# Patient Record
Sex: MALE | Race: BLACK OR AFRICAN AMERICAN | NOT HISPANIC OR LATINO | Employment: FULL TIME | URBAN - METROPOLITAN AREA
[De-identification: names, ages, dates, MRNs, and addresses within clinical notes are randomized per-mention and may not be internally consistent; named-entity substitution may affect disease eponyms.]

---

## 2017-01-03 ENCOUNTER — TELEPHONE (OUTPATIENT)
Dept: RHEUMATOLOGY | Facility: CLINIC | Age: 57
End: 2017-01-03

## 2017-01-03 NOTE — TELEPHONE ENCOUNTER
----- Message from Alberto Lucero sent at 12/30/2016  3:15 PM CST -----  Contact: same  Unsuccessful call placed to pod.  Patient called in and stated he was returning a call.    [phone] call back number for patient.

## 2017-01-16 ENCOUNTER — TELEPHONE (OUTPATIENT)
Dept: RHEUMATOLOGY | Facility: CLINIC | Age: 57
End: 2017-01-16

## 2017-01-16 NOTE — TELEPHONE ENCOUNTER
Patient informed that his braces will come from [facility] in [place]. Pt also informed that if they need any additional information then they will contact him. Pt verbalized understanding.

## 2017-01-16 NOTE — TELEPHONE ENCOUNTER
----- Message from Diego Viera sent at 1/13/2017  2:26 PM CST -----  Contact: pt   Pt requesting a callback, needs to know what pharmacy his braces were sent to  Call Back#[phone]  Thanks

## 2017-01-25 ENCOUNTER — CLINICAL SUPPORT (OUTPATIENT)
Dept: REHABILITATION | Facility: HOSPITAL | Age: 57
End: 2017-01-25
Attending: INTERNAL MEDICINE
Payer: COMMERCIAL

## 2017-01-25 DIAGNOSIS — M25.562 ACUTE PAIN OF BOTH KNEES: ICD-10-CM

## 2017-01-25 DIAGNOSIS — M25.561 ACUTE PAIN OF BOTH KNEES: ICD-10-CM

## 2017-01-25 DIAGNOSIS — M25.669 DECREASED RANGE OF MOTION (ROM) OF KNEE: Primary | ICD-10-CM

## 2017-01-25 PROCEDURE — G8978 MOBILITY CURRENT STATUS: HCPCS | Mod: CK

## 2017-01-25 PROCEDURE — 97161 PT EVAL LOW COMPLEX 20 MIN: CPT

## 2017-01-25 PROCEDURE — G8979 MOBILITY GOAL STATUS: HCPCS | Mod: CJ

## 2017-01-25 NOTE — PROGRESS NOTES
"Physical Therapy Evaluation    Name: Dayo Goodman  Clinic Number: [id number]    Diagnosis:   Encounter Diagnoses   Name Primary?    Decreased range of motion (ROM) of knee Yes    Acute pain of both knees      Physician: Crissy Morrison MD  Treatment Orders: PT Eval and Treat    Past Medical History   Diagnosis Date    Arthritis     Cyst, Baker's knee      L    GERD (gastroesophageal reflux disease)     Kidney stones      Current Outpatient Prescriptions   Medication Sig    baclofen (LIORESAL) 10 MG tablet Take 10 mg by mouth once daily.    fexofenadine (ALLEGRA) 180 MG tablet Take 1 tablet (180 mg total) by mouth once daily. (Patient taking differently: Take 180 mg by mouth daily as needed. )    naproxen (EC NAPROSYN) 500 MG EC tablet Take 1 tablet (500 mg total) by mouth 2 (two) times daily with meals.    omeprazole (PRILOSEC) 40 MG capsule Take 1 capsule (40 mg total) by mouth every morning. (Patient taking differently: Take 40 mg by mouth daily as needed. )     No current facility-administered medications for this visit.      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    Primary concern/ Chief complaints:  Dayo is a 56 y.o. male that presents to [facility] secondary to polyarthralgia. Injury/surgery occurred approximately: 6 mos prior to evaluation.  X-ray was taken and revealed No fracture, subluxation, or other significant abnormality is identified.  Joints and soft tissues are unremarkable. Pt denied numbness and tingling of BLE. Pt reports approx 6 mos prior to evaluation Pt began experiencing bilateral knee pain. Pt reported gaining approx 30 lbs within this time frame. Pt attributed current work " duties, lack of physical exercise secondary to weight gain, and poor eating habbit to current condition. Pt reports swelling with knee buckling on occasion.     Onset/MYRNA: gradual    Previous treatment: Pt denied previous treatment for current condition.    Pain scale: Dayo rates pain on a scale of 0-10 to be n/a currently; 8 at best; 0 at worst.    Aggravating factors: squatting sit to stand, bending, and stooping   Relieving factors: Pt reports rest and ice     ADLs: Pt has a decrease ability to perform ADLs such as cooking and cleaning.     Patient Goals: Pt would like to decrease pain and increase function so he can return to pain free normal daily activities of: .    Objective       Gait: Pt ambulates  with decreased bilateral knee flexion, increase trunk lean for BLE advancement      Active/Passive ROM: (measured in degrees)   Knee (R) (L)   Flexion 110 120   Extension 0 0   Crepitus with left knee flexion       Sensation: Intact light touch grossly intact     Lower Extremity Strength (graded 0-5 out of 5)    Right LE Left LE   Hip flexion: 4/5 4/5   Hip ER: 4/5 4/5   Hip IR: 4/5 4/5   Knee extension:  4/5 4/5   Ankle dorsiflexion: 4/5 4/5   Posterior fibers of Gluteus Medius 4-/5 4-/5   Hip extension: 4-/5 4-/5   Knee flexion:  4/5 4/5   Ankle plantarflexion: 4/5 4/5     Special Tests: (pos. or neg.)    (R) (L)   DLS squat - -   Pt performed squat in parallel bars with increased use of BUE in a quad dominant position, and increased forward trunk lean.     Joint Mobility: (grading 0-6 out of 6)     RLE LLE   Superior glide of patella on femur 2 3   Inferior patella glide 2 3   Lateral patella glide 2 3   Medial patella glide 2 3       Flexibility:    Ely's test: R = +   ; L = +   90/90 HS length: R =  +; L = +     Edema:     Girth Measurement Joint line   Left  48.5 cm   Right 47.5  cm     Functional Status Measures:    Intake Score     Pts Physical FS Primary Measure      54                          Risk  Adjustment Statistical FOTO     53  Skill        Mobility   Current Status      CK at least 40% < 60% impaired, limited or restricted  Goal Status          CJ at least 20% < 40% impaired, limited or restricted      Patient History Examination Clinical Presentation Clinical Decision Making   Comorbidities:  None           Activity and Participation Restriction:  mobility    Body Systems:  ROM   Strength     Body Regions:  LES Stable and uncomplicated     Low            PT reviewed FOTO scores for Dayo Goodman on 01/25/2017.   FOTO scores were entered into [employer] - see media section.      PT Evaluation Completed? Yes  Discussed Plan of Care with patient: Yes    TREATMENT:    Pt. Education: Instructed pt. regarding: Proper technique with all exercises, diagnosis, prognosis, goals, and POC. Pt demo good understanding of the education provided. Dayo demonstrated good return demonstration of activities. No cultural, environmental, or spiritual barriers identified to treatment or learning.    Medical necessity is demonstrated by the following IMPAIRMENTS/PROBLEMS LIST:   1) Pain limiting function   2) Gait abnormality   3) Quadricep muscle weakness   4) Decreased flexibility   5) Decreased ROM   6) Decreased exercise ability   7) Lack of HEP    GOALS:   Short Term Goals: 6 weeks  1. Pt. to report decreased bilateral knee pain </= 3/10 at worst to increase tolerance for work related activities with or without AD  2. Pt. to demonstrate proper gait mechanics requiring mod. To min. verbal cues from PT  3. Pt. to demonstrate increased MMT for gluteus medius to 4+/5 to increase stability during community ambulation  4. Pt. to demonstrate increased MMT for quadriceps to 4+/5 to increase ability to squat  5. Pt to tolerate HEP to improve ROM and independence with ADL's  6. Pt will perform Double leg squats X 5 repetitions in a glut dominant manner       Long Term Goals: 12 weeks  1. Pt. to report decreased bilateral knee pain </=  1/10 at worst to increase tolerance for work related activities with or without AD  2. Pt. to demonstrate proper gait mechanics requiring min. verbal cues from PT  3. Pt. to demonstrate increased MMT for gluteus medius to 5/5 to increase stability during community ambulation  4. Pt. to demonstrate increased MMT for quadriceps to 5/5 to increase ability to squat  5. Pt to tolerate HEP to improve ROM and independence with ADL's  6. 6. Pt will perform Double leg squats X 10 repetitions in a glut dominant manner without UE use    8. Pt will report at CJ level (20-40% impaired) on LE FOTO survey to demonstrate increase in LE function and mobility in home and community environment.   9. Pt to be Independent with HEP to improve ROM and independence with ADL's     Assessment   This is a 56 y.o. male referred to outpatient physical therapy who presents with a medical diagnosis of polyarthralgia and PT diagnosis of decreased strength and ROM of BLE demonstrating joint dysfunction and functional limitation as described above. Pt presents with decreased strength and ROM resulting in a decreased tolerance for land based therex and work related activity tolerance and would benefit from aquatic therapy to performed therex in weight reduced environment.  Level of complexity is low;  based on patient's past medical history including the above co-morbidities and personal factors; functional limitations, and clinical presentation directly impacting his/her plan of care.    The goals were discussed and patient agreed with plan of care. Pt was given a HEP consisting of squats performed via sit to stand. Pt verbally understood the instructions as they were given and demonstrated proper form/ technique. Pt was advised to perform these exercises free of pain, and discontinue use if pain persists/worsens.t. Pt. will benefit from physcial therapy services in order to maximize pain free and/or functional use of bilateral knees.     Plan      Pt will be treated by physical therapy 1-2 times a week for 12 weeks for Pt education, HEP, therapeutic exercises, neuromuscular re-education, soft tissue and joint mobilizations; modalities prn to achieve established goals. Dayo may at times be seen by a PTA as part of the Rehab Team.     I certify the need for these services furnished under this plan of treatment and while under my care.______________________________ Physician/Referring Practitioner  Date of Signature    Phil Farley, PT

## 2017-01-25 NOTE — PLAN OF CARE
"Physical Therapy Evaluation    Name: Dayo Goodman  Clinic Number: [id number]    Diagnosis:   Encounter Diagnoses   Name Primary?    Decreased range of motion (ROM) of knee Yes    Acute pain of both knees      Physician: Crissy Morrison MD  Treatment Orders: PT Eval and Treat    Past Medical History   Diagnosis Date    Arthritis     Cyst, Baker's knee      L    GERD (gastroesophageal reflux disease)     Kidney stones      Current Outpatient Prescriptions   Medication Sig    baclofen (LIORESAL) 10 MG tablet Take 10 mg by mouth once daily.    fexofenadine (ALLEGRA) 180 MG tablet Take 1 tablet (180 mg total) by mouth once daily. (Patient taking differently: Take 180 mg by mouth daily as needed. )    naproxen (EC NAPROSYN) 500 MG EC tablet Take 1 tablet (500 mg total) by mouth 2 (two) times daily with meals.    omeprazole (PRILOSEC) 40 MG capsule Take 1 capsule (40 mg total) by mouth every morning. (Patient taking differently: Take 40 mg by mouth daily as needed. )     No current facility-administered medications for this visit.      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    Primary concern/ Chief complaints:  Dayo is a 56 y.o. male that presents to [facility] secondary to polyarthralgia. Injury/surgery occurred approximately: 6 mos prior to evaluation.  X-ray was taken and revealed No fracture, subluxation, or other significant abnormality is identified.  Joints and soft tissues are unremarkable. Pt denied numbness and tingling of BLE. Pt reports approx 6 mos prior to evaluation Pt began experiencing bilateral knee pain. Pt reported gaining approx 30 lbs within this time frame. Pt attributed current work " duties, lack of physical exercise secondary to weight gain, and poor eating habbit to current condition. Pt reports swelling with knee buckling on occasion.     Onset/YMRNA: gradual    Previous treatment: Pt denied previous treatment for current condition.    Pain scale: Dayo rates pain on a scale of 0-10 to be n/a currently; 8 at best; 0 at worst.    Aggravating factors: squatting sit to stand, bending, and stooping   Relieving factors: Pt reports rest and ice     ADLs: Pt has a decrease ability to perform ADLs such as cooking and cleaning.     Patient Goals: Pt would like to decrease pain and increase function so he can return to pain free normal daily activities of: .    Objective       Gait: Pt ambulates  with decreased bilateral knee flexion, increase trunk lean for BLE advancement      Active/Passive ROM: (measured in degrees)   Knee (R) (L)   Flexion 110 120   Extension 0 0   Crepitus with left knee flexion       Sensation: Intact light touch grossly intact     Lower Extremity Strength (graded 0-5 out of 5)    Right LE Left LE   Hip flexion: 4/5 4/5   Hip ER: 4/5 4/5   Hip IR: 4/5 4/5   Knee extension:  4/5 4/5   Ankle dorsiflexion: 4/5 4/5   Posterior fibers of Gluteus Medius 4-/5 4-/5   Hip extension: 4-/5 4-/5   Knee flexion:  4/5 4/5   Ankle plantarflexion: 4/5 4/5     Special Tests: (pos. or neg.)    (R) (L)   DLS squat - -   Pt performed squat in parallel bars with increased use of BUE in a quad dominant position, and increased forward trunk lean.     Joint Mobility: (grading 0-6 out of 6)     RLE LLE   Superior glide of patella on femur 2 3   Inferior patella glide 2 3   Lateral patella glide 2 3   Medial patella glide 2 3       Flexibility:    Ely's test: R = +   ; L = +   90/90 HS length: R =  +; L = +     Edema:     Girth Measurement Joint line   Left  48.5 cm   Right 47.5  cm     Functional Status Measures:    Intake Score     Pts Physical FS Primary Measure      54                          Risk  Adjustment Statistical FOTO     53  Skill        Mobility   Current Status      CK at least 40% < 60% impaired, limited or restricted  Goal Status          CJ at least 20% < 40% impaired, limited or restricted      Patient History Examination Clinical Presentation Clinical Decision Making   Comorbidities:  None           Activity and Participation Restriction:  mobility    Body Systems:  ROM   Strength     Body Regions:  LES Stable and uncomplicated     Low            PT reviewed FOTO scores for Dayo Goodman on 01/25/2017.   FOTO scores were entered into [employer] - see media section.      PT Evaluation Completed? Yes  Discussed Plan of Care with patient: Yes    TREATMENT:    Pt. Education: Instructed pt. regarding: Proper technique with all exercises, diagnosis, prognosis, goals, and POC. Pt demo good understanding of the education provided. Dayo demonstrated good return demonstration of activities. No cultural, environmental, or spiritual barriers identified to treatment or learning.    Medical necessity is demonstrated by the following IMPAIRMENTS/PROBLEMS LIST:   1) Pain limiting function   2) Gait abnormality   3) Quadricep muscle weakness   4) Decreased flexibility   5) Decreased ROM   6) Decreased exercise ability   7) Lack of HEP    GOALS:   Short Term Goals: 6 weeks  1. Pt. to report decreased bilateral knee pain </= 3/10 at worst to increase tolerance for work related activities with or without AD  2. Pt. to demonstrate proper gait mechanics requiring mod. To min. verbal cues from PT  3. Pt. to demonstrate increased MMT for gluteus medius to 4+/5 to increase stability during community ambulation  4. Pt. to demonstrate increased MMT for quadriceps to 4+/5 to increase ability to squat  5. Pt to tolerate HEP to improve ROM and independence with ADL's  6. Pt will perform Double leg squats X 5 repetitions in a glut dominant manner       Long Term Goals: 12 weeks  1. Pt. to report decreased bilateral knee pain </=  1/10 at worst to increase tolerance for work related activities with or without AD  2. Pt. to demonstrate proper gait mechanics requiring min. verbal cues from PT  3. Pt. to demonstrate increased MMT for gluteus medius to 5/5 to increase stability during community ambulation  4. Pt. to demonstrate increased MMT for quadriceps to 5/5 to increase ability to squat  5. Pt to tolerate HEP to improve ROM and independence with ADL's  6. 6. Pt will perform Double leg squats X 10 repetitions in a glut dominant manner without UE use    8. Pt will report at CJ level (20-40% impaired) on LE FOTO survey to demonstrate increase in LE function and mobility in home and community environment.   9. Pt to be Independent with HEP to improve ROM and independence with ADL's     Assessment   This is a 56 y.o. male referred to outpatient physical therapy who presents with a medical diagnosis of polyarthralgia and PT diagnosis of decreased strength and ROM of BLE demonstrating joint dysfunction and functional limitation as described above. Pt presents with decreased strength and ROM resulting in a decreased tolerance for land based therex and work related activity tolerance and would benefit from aquatic therapy to performed therex in weight reduced environment.  Level of complexity is low;  based on patient's past medical history including the above co-morbidities and personal factors; functional limitations, and clinical presentation directly impacting his/her plan of care.    The goals were discussed and patient agreed with plan of care. Pt was given a HEP consisting of squats performed via sit to stand. Pt verbally understood the instructions as they were given and demonstrated proper form/ technique. Pt was advised to perform these exercises free of pain, and discontinue use if pain persists/worsens.t. Pt. will benefit from physcial therapy services in order to maximize pain free and/or functional use of bilateral knees.     Plan      Pt will be treated by physical therapy 1-2 times a week for 12 weeks for Pt education, HEP, therapeutic exercises, neuromuscular re-education, soft tissue and joint mobilizations; modalities prn to achieve established goals. Dayo may at times be seen by a PTA as part of the Rehab Team.     I certify the need for these services furnished under this plan of treatment and while under my care.______________________________ Physician/Referring Practitioner  Date of Signature    Phil Farley, PT

## 2017-03-23 ENCOUNTER — DOCUMENTATION ONLY (OUTPATIENT)
Dept: FAMILY MEDICINE | Facility: CLINIC | Age: 57
End: 2017-03-23

## 2017-03-29 ENCOUNTER — OFFICE VISIT (OUTPATIENT)
Dept: RHEUMATOLOGY | Facility: CLINIC | Age: 57
End: 2017-03-29
Payer: COMMERCIAL

## 2017-03-29 VITALS
HEIGHT: 76 IN | WEIGHT: 315 LBS | SYSTOLIC BLOOD PRESSURE: 131 MMHG | BODY MASS INDEX: 38.36 KG/M2 | DIASTOLIC BLOOD PRESSURE: 81 MMHG | HEART RATE: 59 BPM

## 2017-03-29 DIAGNOSIS — H93.12 TINNITUS, LEFT: Primary | ICD-10-CM

## 2017-03-29 DIAGNOSIS — Z79.1 NSAID LONG-TERM USE: ICD-10-CM

## 2017-03-29 DIAGNOSIS — M15.9 PRIMARY OSTEOARTHRITIS INVOLVING MULTIPLE JOINTS: ICD-10-CM

## 2017-03-29 PROCEDURE — 99213 OFFICE O/P EST LOW 20 MIN: CPT | Mod: S$GLB,,, | Performed by: INTERNAL MEDICINE

## 2017-03-29 PROCEDURE — 99999 PR PBB SHADOW E&M-EST. PATIENT-LVL III: CPT | Mod: PBBFAC,,, | Performed by: INTERNAL MEDICINE

## 2017-03-29 PROCEDURE — 1160F RVW MEDS BY RX/DR IN RCRD: CPT | Mod: S$GLB,,, | Performed by: INTERNAL MEDICINE

## 2017-03-29 RX ORDER — NAPROXEN 500 MG/1
500 TABLET ORAL 2 TIMES DAILY WITH MEALS
Qty: 60 TABLET | Refills: 5 | Status: SHIPPED | OUTPATIENT
Start: 2017-03-29 | End: 2017-07-23

## 2017-03-29 NOTE — PROGRESS NOTES
"Subjective:       Patient ID: Dayo Goodman is a 56 y.o. male.    Chief Complaint: Osteoarthritis   HPI56 yo male with h/o kidney stones is here for follow-up for osteoarthritis.  Reports right knee pain and elbow pain is significantly better as the last visit.  Reports naproxen helps the joint pains.  Tolerating medication without any side effects. He chronically elevated but normal aldolase secondary to ethnicity-no evidence of weakness or myositis  Today, he is also complaining of tinnitus in left ear for the last 1 week.  Discontinuing meds naproxen did not improve tinnitus.  Denies any active discharge from ear, denies any fever.       Review of Systems   Constitutional: Negative for fatigue and fever.   HENT: Positive for tinnitus. Negative for ear discharge and ear pain.    Eyes: Negative for pain and redness.   Respiratory: Negative for cough and shortness of breath.    Cardiovascular: Negative for chest pain and palpitations.   Gastrointestinal: Negative for abdominal distention and abdominal pain.   Genitourinary: Negative for genital sores and hematuria.   Musculoskeletal: Negative for gait problem and myalgias.         Objective:     /81  Pulse (!) 59  Ht 6' 4" (1.93 m)  Wt (!) 150.6 kg (332 lb 0.2 oz)  BMI 40.41 kg/m2     Physical Exam   Constitutional: He is oriented to person, place, and time and well-developed, well-nourished, and in no distress.   HENT:   Head: Normocephalic and atraumatic.   Right Ear: External ear normal.   Left Ear: External ear normal.   Eyes: Conjunctivae and EOM are normal. Pupils are equal, round, and reactive to light.   Neck: Normal range of motion. Neck supple. No thyromegaly present.   Cardiovascular: Normal rate and regular rhythm.    Pulmonary/Chest: Effort normal and breath sounds normal.   Abdominal: Soft. Bowel sounds are normal.   Neurological: He is alert and oriented to person, place, and time.   Skin: Skin is warm and dry.     Psychiatric: Memory and " affect normal.   Musculoskeletal: He exhibits no edema.     Strength 5 x 5 and a reflexes 2+ in all extremities         .LASSTCBC  CMP  Sodium   Date Value Ref Range Status   10/21/2016 140 136 - 145 mmol/L Final     Potassium   Date Value Ref Range Status   10/21/2016 4.2 3.5 - 5.1 mmol/L Final     Chloride   Date Value Ref Range Status   10/21/2016 104 95 - 110 mmol/L Final     CO2   Date Value Ref Range Status   10/21/2016 29 23 - 29 mmol/L Final     Glucose   Date Value Ref Range Status   10/21/2016 82 70 - 110 mg/dL Final     BUN, Bld   Date Value Ref Range Status   10/21/2016 12 6 - 20 mg/dL Final     Creatinine   Date Value Ref Range Status   10/21/2016 1.2 0.5 - 1.4 mg/dL Final     Calcium   Date Value Ref Range Status   10/21/2016 8.9 8.7 - 10.5 mg/dL Final     Total Protein   Date Value Ref Range Status   07/21/2016 7.3 6.0 - 8.4 g/dL Final     Albumin   Date Value Ref Range Status   07/21/2016 3.5 3.5 - 5.2 g/dL Final     Total Bilirubin   Date Value Ref Range Status   07/21/2016 0.8 0.1 - 1.0 mg/dL Final     Comment:     For infants and newborns, interpretation of results should be based  on gestational age, weight and in agreement with clinical  observations.  Premature Infant recommended reference ranges:  Up to 24 hours.............<8.0 mg/dL  Up to 48 hours............<12.0 mg/dL  3-5 days..................<15.0 mg/dL  6-29 days.................<15.0 mg/dL       Alkaline Phosphatase   Date Value Ref Range Status   07/21/2016 62 55 - 135 U/L Final     AST   Date Value Ref Range Status   07/21/2016 19 10 - 40 U/L Final     ALT   Date Value Ref Range Status   07/21/2016 16 10 - 44 U/L Final     Anion Gap   Date Value Ref Range Status   10/21/2016 7 (L) 8 - 16 mmol/L Final     eGFR if    Date Value Ref Range Status   10/21/2016 >60.0 >60 mL/min/1.73 m^2 Final     eGFR if non    Date Value Ref Range Status   10/21/2016 >60.0 >60 mL/min/1.73 m^2 Final     Comment:      Calculation used to obtain the estimated glomerular filtration  rate (eGFR) is the CKD-EPI equation. Since race is unknown   in our information system, the eGFR values for   -American and Non--American patients are given   for each creatinine result.       Lab Results   Component Value Date    SEDRATE 13 (H) 10/21/2016     Lab Results   Component Value Date    RF <10.0 10/21/2016   Results for HERMELINDA TARIQ (MRN [id number]) as of 3/29/2017 11:24   Ref. Range 12/29/2016 12:04   Aldolase Latest Ref Range: 1.2 - 7.6 U/L 4.0       Results for HERMELINDA TARIQ (MRN [id number]) as of 12/29/2016 12:05   Ref. Range 3/25/2012 12:41 10/9/2013 07:52 10/9/2013 07:52 10/9/2013 16:47 10/21/2016 15:21   CPK Latest Ref Range: 20 - 200 U/L 565 (H) 512 (H) 512 (H) 419 (H) 381 (H)   Results for HERMELINDA TARIQ (MRN [id number]) as of 3/29/2017 11:24   Ref. Range 10/9/2013 07:52 10/9/2013 07:52 10/9/2013 16:47 10/21/2016 15:21 12/29/2016 12:04   CPK Latest Ref Range: 20 - 200 U/L 512 (H) 512 (H) 419 (H) 381 (H) 497 (H)           Assessment:   Left ear tinnitus-ENT referral  Generalized osteoarthritis  Long-term NSAID use  Elevated CPK-chronic elevation-likely secondary to ethnicity-no evidence of weakness on exam-check aldolase    Plan:  ENT referral for chronic tinnitus  Continue naproxen 500 mg twice a day as needed for arthralgias  Return to clinic in 1 year

## 2017-04-04 ENCOUNTER — OFFICE VISIT (OUTPATIENT)
Dept: FAMILY MEDICINE | Facility: CLINIC | Age: 57
End: 2017-04-04
Payer: COMMERCIAL

## 2017-04-04 ENCOUNTER — DOCUMENTATION ONLY (OUTPATIENT)
Dept: FAMILY MEDICINE | Facility: CLINIC | Age: 57
End: 2017-04-04

## 2017-04-04 VITALS
HEIGHT: 76 IN | SYSTOLIC BLOOD PRESSURE: 118 MMHG | OXYGEN SATURATION: 98 % | BODY MASS INDEX: 38.36 KG/M2 | WEIGHT: 315 LBS | TEMPERATURE: 98 F | DIASTOLIC BLOOD PRESSURE: 76 MMHG | HEART RATE: 59 BPM

## 2017-04-04 DIAGNOSIS — J06.9 UPPER RESPIRATORY TRACT INFECTION, UNSPECIFIED TYPE: Primary | ICD-10-CM

## 2017-04-04 PROCEDURE — 1160F RVW MEDS BY RX/DR IN RCRD: CPT | Mod: S$GLB,,, | Performed by: PHYSICIAN ASSISTANT

## 2017-04-04 PROCEDURE — 99213 OFFICE O/P EST LOW 20 MIN: CPT | Mod: S$GLB,,, | Performed by: PHYSICIAN ASSISTANT

## 2017-04-04 PROCEDURE — 99999 PR PBB SHADOW E&M-EST. PATIENT-LVL III: CPT | Mod: PBBFAC,,, | Performed by: PHYSICIAN ASSISTANT

## 2017-04-04 RX ORDER — AMOXICILLIN AND CLAVULANATE POTASSIUM 875; 125 MG/1; MG/1
1 TABLET, FILM COATED ORAL EVERY 12 HOURS
Qty: 20 TABLET | Refills: 0 | Status: SHIPPED | OUTPATIENT
Start: 2017-04-04 | End: 2017-04-14

## 2017-04-04 RX ORDER — METHYLPREDNISOLONE 4 MG/1
4 TABLET ORAL DAILY
Qty: 21 TABLET | Refills: 0 | Status: SHIPPED | OUTPATIENT
Start: 2017-04-04 | End: 2017-06-12

## 2017-04-04 NOTE — PROGRESS NOTES
Pre-Visit Chart Review  For Appointment Scheduled on 04/04/2017      Health Maintenance Due   Topic Date Due    TETANUS VACCINE  08/26/1977    Colonoscopy  08/26/2009    Influenza Vaccine  08/01/2016

## 2017-04-04 NOTE — PROGRESS NOTES
Subjective:       Patient ID: Dayo Goodman is a 56 y.o. male.    Chief Complaint: Cough (congestion)    Cough   This is a new problem. The current episode started in the past 7 days. The problem has been gradually worsening. The problem occurs constantly. The cough is non-productive. Associated symptoms include nasal congestion, postnasal drip, rhinorrhea and a sore throat. Pertinent negatives include no chest pain, chills, ear congestion, ear pain, eye redness, fever, headaches, shortness of breath or wheezing. The symptoms are aggravated by lying down. He has tried OTC cough suppressant for the symptoms. The treatment provided no relief.     Review of Systems   Constitutional: Negative for activity change, appetite change, chills, fatigue and fever.   HENT: Positive for congestion, postnasal drip, rhinorrhea, sinus pressure and sore throat. Negative for ear discharge, ear pain, facial swelling, hearing loss, mouth sores, nosebleeds, tinnitus and trouble swallowing.    Eyes: Negative for discharge, redness and visual disturbance.   Respiratory: Positive for cough. Negative for chest tightness, shortness of breath and wheezing.    Cardiovascular: Negative for chest pain, palpitations and leg swelling.   Gastrointestinal: Negative for abdominal pain, nausea and vomiting.   Musculoskeletal: Negative for neck stiffness.   Neurological: Negative for headaches.       Objective:      Physical Exam   Constitutional: He appears well-developed and well-nourished. No distress.   HENT:   Head: Normocephalic and atraumatic.   Right Ear: External ear normal.   Left Ear: External ear normal.   Mouth/Throat: Uvula is midline and mucous membranes are normal. No uvula swelling. No oropharyngeal exudate, posterior oropharyngeal edema, posterior oropharyngeal erythema or tonsillar abscesses.   Eyes: Conjunctivae and EOM are normal. Pupils are equal, round, and reactive to light. Right eye exhibits no discharge. Left eye exhibits no  discharge.   Neck: Normal range of motion. Neck supple. No thyromegaly present.   Cardiovascular: Normal rate, regular rhythm and normal heart sounds.  Exam reveals no gallop and no friction rub.    No murmur heard.  Pulmonary/Chest: Effort normal. No respiratory distress. He has no wheezes. He has rhonchi in the left upper field, the left middle field and the left lower field. He has no rales.   Abdominal: Soft. Bowel sounds are normal. There is no tenderness.   Lymphadenopathy:     He has no cervical adenopathy.   Skin: He is not diaphoretic.       Assessment:       1. Upper respiratory tract infection, unspecified type        Plan:       Dayo was seen today for cough.    Diagnoses and all orders for this visit:    Upper respiratory tract infection, unspecified type  -     methylPREDNISolone (MEDROL, EMILEE,) 4 mg tablet; Take 1 tablet (4 mg total) by mouth once daily. follow package directions  -     amoxicillin-clavulanate 875-125mg (AUGMENTIN) 875-125 mg per tablet; Take 1 tablet by mouth every 12 (twelve) hours.

## 2017-04-11 ENCOUNTER — TELEPHONE (OUTPATIENT)
Dept: FAMILY MEDICINE | Facility: CLINIC | Age: 57
End: 2017-04-11

## 2017-04-11 DIAGNOSIS — R05.9 COUGH: Primary | ICD-10-CM

## 2017-04-11 RX ORDER — BENZONATATE 100 MG/1
100 CAPSULE ORAL 3 TIMES DAILY PRN
Qty: 30 CAPSULE | Refills: 0 | Status: SHIPPED | OUTPATIENT
Start: 2017-04-11 | End: 2017-04-21

## 2017-04-11 NOTE — TELEPHONE ENCOUNTER
Patient was seen on 4/4/17 and was given a medrol shabana and augmentin  He has two days of antibiotic left.  He states that the cough is not any better.  Wants to know he can take for the cough.

## 2017-04-11 NOTE — TELEPHONE ENCOUNTER
----- Message from Eli Wan sent at 4/11/2017 12:09 PM CDT -----  Contact:   call //[phone]    Calling to  Speak to the    Nurse , pt   Needs a  Script  For  A  Bad  ,  Cold  ,  Not  Going away  // please call   [employer] [id number] - [address] AT [facility]  [address]  [place] [address]  Phone: [phone] Fax: [phone]

## 2017-05-09 ENCOUNTER — TELEPHONE (OUTPATIENT)
Dept: CARDIOLOGY | Facility: CLINIC | Age: 57
End: 2017-05-09

## 2017-05-09 NOTE — TELEPHONE ENCOUNTER
----- Message from Maryellen Brown sent at 5/9/2017  2:40 PM CDT -----  Contact: PAtient  Patient states his legs are hurting and feel tired. Not swollen, just achy. He is on his feet a lot for work. Please call patient at [phone] to advise. Medication he takes now is Napercin.

## 2017-05-29 ENCOUNTER — DOCUMENTATION ONLY (OUTPATIENT)
Dept: REHABILITATION | Facility: HOSPITAL | Age: 57
End: 2017-05-29

## 2017-05-29 NOTE — PROGRESS NOTES
PHYSICAL THERAPY DISCHARGE SUMMARY     Name: Dayo Goodman  [facility] Number: [id number]    Diagnosis: Decreased range of motion, acute pain of both knees  Physician: Crissy Morrison MD  Treatment Orders: Therapeutic exercise  Past Medical History:   Diagnosis Date    Arthritis     Cyst, Baker's knee     L    GERD (gastroesophageal reflux disease)     Kidney stones        Initial visit: 1/25/17  Date of Last visit: 1/25/17  Date of Discharge Note:  5/29/17  Total Visits Received: 1  Missed Visits: 7  ASSESSMENT   Status Towards Goals Met:  Patient did not return to physical therapy.  Pt is discharged prior to achieving the goals established in the initial evaluation due to discontinuing physical therapy.     Goals Not achieved and why: see above    Discharge reason : Pt has not re-scheduled further follow-up sessions    G-CODE: Discharge g-code not filed due to discharge note being documentation only. Upon eval Pt was at CK at least 40% < 60% impaired, limited or restricted on the FOTO with g-code goal set at CJ at least 20% < 40% impaired, limited or restricted    PLAN   This patient is discharged from Physical Therapy Services.     Medical necessity is demonstrated by the following IMPAIRMENTS/PROBLEMS LIST:                        1) Pain limiting function                        2) Gait abnormality                        3) Quadricep muscle weakness                        4) Decreased flexibility                        5) Decreased ROM                        6) Decreased exercise ability                        7) Lack of HEP     GOALS:   Short Term Goals: 6 weeks  1. Pt. to report decreased bilateral knee pain </= 3/10 at worst to increase tolerance for work related activities with or without AD  2. Pt. to demonstrate proper gait mechanics requiring mod. To min. verbal cues from PT  3. Pt. to demonstrate increased MMT for gluteus medius to 4+/5 to increase stability during community ambulation  4. Pt. to  demonstrate increased MMT for quadriceps to 4+/5 to increase ability to squat  5. Pt to tolerate HEP to improve ROM and independence with ADL's  6. Pt will perform Double leg squats X 5 repetitions in a glut dominant manner       Long Term Goals: 12 weeks  1. Pt. to report decreased bilateral knee pain </= 1/10 at worst to increase tolerance for work related activities with or without AD  2. Pt. to demonstrate proper gait mechanics requiring min. verbal cues from PT  3. Pt. to demonstrate increased MMT for gluteus medius to 5/5 to increase stability during community ambulation  4. Pt. to demonstrate increased MMT for quadriceps to 5/5 to increase ability to squat  5. Pt to tolerate HEP to improve ROM and independence with ADL's  6. 6. Pt will perform Double leg squats X 10 repetitions in a glut dominant manner without UE use    8. Pt will report at CJ level (20-40% impaired) on LE FOTO survey to demonstrate increase in LE function and mobility in home and community environment.   9. Pt to be Independent with HEP to improve ROM and independence with ADL's

## 2017-06-12 ENCOUNTER — OFFICE VISIT (OUTPATIENT)
Dept: FAMILY MEDICINE | Facility: CLINIC | Age: 57
End: 2017-06-12
Payer: COMMERCIAL

## 2017-06-12 VITALS
DIASTOLIC BLOOD PRESSURE: 73 MMHG | BODY MASS INDEX: 38.36 KG/M2 | WEIGHT: 315 LBS | HEART RATE: 53 BPM | HEIGHT: 76 IN | SYSTOLIC BLOOD PRESSURE: 126 MMHG

## 2017-06-12 DIAGNOSIS — M79.645 FINGER PAIN, LEFT: Primary | ICD-10-CM

## 2017-06-12 PROCEDURE — 99214 OFFICE O/P EST MOD 30 MIN: CPT | Mod: 25,S$GLB,, | Performed by: FAMILY MEDICINE

## 2017-06-12 PROCEDURE — 90471 IMMUNIZATION ADMIN: CPT | Mod: S$GLB,,, | Performed by: FAMILY MEDICINE

## 2017-06-12 PROCEDURE — 90715 TDAP VACCINE 7 YRS/> IM: CPT | Mod: S$GLB,,, | Performed by: FAMILY MEDICINE

## 2017-06-12 PROCEDURE — 99999 PR PBB SHADOW E&M-EST. PATIENT-LVL III: CPT | Mod: PBBFAC,,, | Performed by: FAMILY MEDICINE

## 2017-06-12 RX ORDER — IBUPROFEN 800 MG/1
800 TABLET ORAL 3 TIMES DAILY
Qty: 60 TABLET | Refills: 0 | Status: SHIPPED | OUTPATIENT
Start: 2017-06-12 | End: 2017-10-26

## 2017-06-12 NOTE — PROGRESS NOTES
Subjective:       Patient ID: Dayo Goodman is a 56 y.o. male.    Chief Complaint: Hand Pain ( (left) finger smashed in airplane door)    HPI     Left ring finger pain  Pt reports that he injured his finger on 2 days ago.   Pt states that he works as a  for [employer].   He was closing a compartment door and he had his finger jammed.   He has had bleeding to the digit.   He has not received a tetanus shot in last 10 years.   Pt has pain to the finger.   He has noticed discoloration to the digit (puple)    Review of Systems   Constitutional: Negative for chills and fever.   Respiratory: Negative for chest tightness and shortness of breath.    Cardiovascular: Negative for chest pain and leg swelling.   Gastrointestinal: Negative for abdominal pain, constipation, diarrhea and vomiting.   Genitourinary: Negative for decreased urine volume, dysuria and hematuria.   Musculoskeletal: Negative for arthralgias.        Finger pain   Neurological: Negative for weakness and light-headedness.       Objective:      Physical Exam   Constitutional: He appears well-developed and well-nourished. No distress.   Obese male in NAD.   HENT:   Head: Normocephalic and atraumatic.   Mouth/Throat: Oropharynx is clear and moist. No oropharyngeal exudate.   Eyes: EOM are normal. Pupils are equal, round, and reactive to light.   Neck: Normal range of motion. Neck supple. No thyromegaly present.   Cardiovascular: Normal rate, regular rhythm, normal heart sounds and intact distal pulses.    Pulmonary/Chest: Effort normal and breath sounds normal. No respiratory distress. He has no wheezes.   Abdominal: Soft. Bowel sounds are normal. He exhibits no distension and no mass. There is no tenderness.   Musculoskeletal: He exhibits no edema.   Mild erythema to distal 4th finger with superficial laceration. No evidence of active infectious process. Hanging section of skin to lateral aspect of distal phalanx.   Lymphadenopathy:     He has no  cervical adenopathy.   Neurological: He is alert.   Skin: Skin is warm. No rash noted. No erythema.   Psychiatric: He has a normal mood and affect. His behavior is normal.   Vitals reviewed.      Assessment:       1. Finger pain, left        Plan:       1. Finger pain, left  Area cleaned with peroxide and debrided. Silver nitrate applied to wound and bacitracin applied with bandage.    Advised neosporin and wound care to keep dry and clean.  - ibuprofen (ADVIL,MOTRIN) 800 MG tablet; Take 1 tablet (800 mg total) by mouth 3 (three) times daily.  Dispense: 60 tablet; Refill: 0    Portions of this note were created using Dragon voice recognition software. There may be voice recognition errors found in the text, and attempts were made to correct these errors prior to signature    Juan Kumar MD    Family Medicine  6/12/2017

## 2017-06-12 NOTE — LETTER
June 12, 2017      [facility]  [address] [place] [address]  Phone: [phone]  Fax: [phone]       Patient: aDyo Goodman   YOB: 1960  Date of Visit: 06/12/2017    To Whom It May Concern:    Dayo Sabillon was at [facility] on 06/12/2017. He may return to work/school on 6/28/17 with no restrictions. If you have any questions or concerns, or if I can be of further assistance, please do not hesitate to contact me.    Sincerely,  Portions of this note were created using Dragon voice recognition software. There may be voice recognition errors found in the text, and attempts were made to correct these errors prior to signature    Juan Kumar MD    Family Medicine  6/12/2017    Juan Kumar MD

## 2017-06-27 ENCOUNTER — TELEPHONE (OUTPATIENT)
Dept: FAMILY MEDICINE | Facility: CLINIC | Age: 57
End: 2017-06-27

## 2017-06-27 ENCOUNTER — TELEPHONE (OUTPATIENT)
Dept: ORTHOPEDICS | Facility: CLINIC | Age: 57
End: 2017-06-27

## 2017-06-27 ENCOUNTER — OFFICE VISIT (OUTPATIENT)
Dept: CARDIOLOGY | Facility: CLINIC | Age: 57
End: 2017-06-27
Payer: COMMERCIAL

## 2017-06-27 VITALS
HEIGHT: 76 IN | WEIGHT: 315 LBS | SYSTOLIC BLOOD PRESSURE: 129 MMHG | BODY MASS INDEX: 38.36 KG/M2 | HEART RATE: 53 BPM | DIASTOLIC BLOOD PRESSURE: 77 MMHG

## 2017-06-27 DIAGNOSIS — M79.89 LEG SWELLING: ICD-10-CM

## 2017-06-27 DIAGNOSIS — R60.9 EDEMA, UNSPECIFIED TYPE: ICD-10-CM

## 2017-06-27 DIAGNOSIS — R06.09 DYSPNEA ON EXERTION: Primary | ICD-10-CM

## 2017-06-27 PROCEDURE — 99999 PR PBB SHADOW E&M-EST. PATIENT-LVL III: CPT | Mod: PBBFAC,,, | Performed by: INTERNAL MEDICINE

## 2017-06-27 PROCEDURE — 99214 OFFICE O/P EST MOD 30 MIN: CPT | Mod: S$GLB,,, | Performed by: INTERNAL MEDICINE

## 2017-06-27 NOTE — TELEPHONE ENCOUNTER
----- Message from Anjel Garcia sent at 6/27/2017  2:12 PM CDT -----  Contact: pt  José: He's calling in regards to returning to work excuse for 7/1/17, pt states his finger is , please advise, [phone] (home)     Sam: He's calling in regards to a knee brace being ordered for his LT knee, please advise, [phone] (home)

## 2017-06-27 NOTE — PROGRESS NOTES
Subjective:   Patient ID:  Dayo Goodman is a 56 y.o. male who presents for follow-up of Venous insufficeincy and  Hx of venous stents. Patient denies CP, angina or anginal equivalent.    Edema   This is a chronic problem. The current episode started more than 1 year ago. The problem occurs intermittently. The problem has been gradually improving. Pertinent negatives include no chest pain. The symptoms are aggravated by walking. He has tried immobilization for the symptoms.       Review of Systems   Constitution: Negative. Negative for weight gain.   HENT: Negative.    Eyes: Negative.    Cardiovascular: Negative.  Negative for chest pain, leg swelling and palpitations.   Respiratory: Negative.  Negative for shortness of breath.    Endocrine: Negative.    Hematologic/Lymphatic: Negative.    Skin: Negative.    Musculoskeletal: Negative for muscle weakness.   Gastrointestinal: Negative.    Genitourinary: Negative.    Neurological: Negative.  Negative for dizziness.   Psychiatric/Behavioral: Negative.    Allergic/Immunologic: Negative.      Family History   Problem Relation Age of Onset    Heart disease Mother     Cancer Father     Stroke Sister      Past Medical History:   Diagnosis Date    Arthritis     Cyst, Baker's knee     L    GERD (gastroesophageal reflux disease)     Kidney stones      Current Outpatient Prescriptions on File Prior to Visit   Medication Sig Dispense Refill    ibuprofen (ADVIL,MOTRIN) 800 MG tablet Take 1 tablet (800 mg total) by mouth 3 (three) times daily. 60 tablet 0    omeprazole (PRILOSEC) 40 MG capsule Take 1 capsule (40 mg total) by mouth every morning. (Patient taking differently: Take 40 mg by mouth daily as needed. ) 60 capsule 5    fexofenadine (ALLEGRA) 180 MG tablet Take 1 tablet (180 mg total) by mouth once daily. (Patient taking differently: Take 180 mg by mouth daily as needed. ) 30 tablet 5    naproxen (EC NAPROSYN) 500 MG EC tablet Take 1 tablet (500 mg total) by mouth  2 (two) times daily with meals. 60 tablet 5     No current facility-administered medications on file prior to visit.      Review of patient's allergies indicates:  No Known Allergies    Objective:     Physical Exam   Constitutional: He is oriented to person, place, and time. He appears well-developed and well-nourished.   HENT:   Head: Normocephalic and atraumatic.   Eyes: Conjunctivae are normal. Pupils are equal, round, and reactive to light.   Neck: Normal range of motion. Neck supple.   Cardiovascular: Normal rate, regular rhythm, normal heart sounds and intact distal pulses.    Pulmonary/Chest: Effort normal and breath sounds normal.   Abdominal: Soft. Bowel sounds are normal.   Neurological: He is alert and oriented to person, place, and time.   Skin: Skin is warm and dry.   Psychiatric: He has a normal mood and affect.   Nursing note and vitals reviewed.      Assessment:     1. Dyspnea on exertion    2. Leg swelling    3. BMI 40.0-44.9, adult    4. Edema, unspecified type        Plan:     Dyspnea on exertion    Leg swelling    BMI 40.0-44.9, adult    Edema, unspecified type      Continue risk factor modification  exercise

## 2017-06-27 NOTE — TELEPHONE ENCOUNTER
----- Message from Anjel Garcia sent at 6/27/2017  2:12 PM CDT -----  Contact: pt  José: He's calling in regards to returning to work excuse for 7/1/17, pt states his finger is , please advise, [phone] (home)     Sam: He's calling in regards to a knee brace being ordered for his LT knee, please advise, [phone] (home)

## 2017-06-28 DIAGNOSIS — M25.562 PAIN IN BOTH KNEES, UNSPECIFIED CHRONICITY: Primary | ICD-10-CM

## 2017-06-28 DIAGNOSIS — M25.561 PAIN IN BOTH KNEES, UNSPECIFIED CHRONICITY: Primary | ICD-10-CM

## 2017-06-29 ENCOUNTER — OFFICE VISIT (OUTPATIENT)
Dept: ORTHOPEDICS | Facility: CLINIC | Age: 57
End: 2017-06-29
Payer: COMMERCIAL

## 2017-06-29 ENCOUNTER — HOSPITAL ENCOUNTER (OUTPATIENT)
Dept: RADIOLOGY | Facility: HOSPITAL | Age: 57
Discharge: HOME | End: 2017-06-29
Attending: ORTHOPAEDIC SURGERY
Payer: COMMERCIAL

## 2017-06-29 ENCOUNTER — OFFICE VISIT (OUTPATIENT)
Dept: FAMILY MEDICINE | Facility: CLINIC | Age: 57
End: 2017-06-29
Payer: COMMERCIAL

## 2017-06-29 VITALS
WEIGHT: 315 LBS | DIASTOLIC BLOOD PRESSURE: 83 MMHG | HEIGHT: 76 IN | SYSTOLIC BLOOD PRESSURE: 132 MMHG | HEART RATE: 46 BPM | BODY MASS INDEX: 38.36 KG/M2

## 2017-06-29 VITALS
WEIGHT: 315 LBS | DIASTOLIC BLOOD PRESSURE: 101 MMHG | BODY MASS INDEX: 38.36 KG/M2 | SYSTOLIC BLOOD PRESSURE: 155 MMHG | HEIGHT: 76 IN | HEART RATE: 68 BPM

## 2017-06-29 DIAGNOSIS — M17.0 PRIMARY OSTEOARTHRITIS OF BOTH KNEES: Primary | ICD-10-CM

## 2017-06-29 DIAGNOSIS — M25.561 PAIN IN BOTH KNEES, UNSPECIFIED CHRONICITY: ICD-10-CM

## 2017-06-29 DIAGNOSIS — M25.562 PAIN IN BOTH KNEES, UNSPECIFIED CHRONICITY: ICD-10-CM

## 2017-06-29 DIAGNOSIS — S69.90XD FINGER INJURY, UNSPECIFIED LATERALITY, SUBSEQUENT ENCOUNTER: Primary | ICD-10-CM

## 2017-06-29 PROCEDURE — 99999 PR PBB SHADOW E&M-EST. PATIENT-LVL II: CPT | Mod: PBBFAC,,, | Performed by: FAMILY MEDICINE

## 2017-06-29 PROCEDURE — 73564 X-RAY EXAM KNEE 4 OR MORE: CPT | Mod: TC,50,PN

## 2017-06-29 PROCEDURE — 97760 ORTHOTIC MGMT&TRAING 1ST ENC: CPT | Mod: S$GLB,,, | Performed by: ORTHOPAEDIC SURGERY

## 2017-06-29 PROCEDURE — 99213 OFFICE O/P EST LOW 20 MIN: CPT | Mod: S$GLB,,, | Performed by: ORTHOPAEDIC SURGERY

## 2017-06-29 PROCEDURE — 99212 OFFICE O/P EST SF 10 MIN: CPT | Mod: S$GLB,,, | Performed by: FAMILY MEDICINE

## 2017-06-29 PROCEDURE — 73564 X-RAY EXAM KNEE 4 OR MORE: CPT | Mod: 26,50,, | Performed by: RADIOLOGY

## 2017-06-29 PROCEDURE — 99999 PR PBB SHADOW E&M-EST. PATIENT-LVL III: CPT | Mod: PBBFAC,,, | Performed by: ORTHOPAEDIC SURGERY

## 2017-06-29 NOTE — PROGRESS NOTES
Past Medical History:   Diagnosis Date    Arthritis     Cyst, Baker's knee     L    GERD (gastroesophageal reflux disease)     Kidney stones        Past Surgical History:   Procedure Laterality Date    kidney stone removal      KNEE SURGERY      PERIPHERAL ARTERIAL STENT GRAFT      leg    SHOULDER SURGERY         Current Outpatient Prescriptions   Medication Sig    ibuprofen (ADVIL,MOTRIN) 800 MG tablet Take 1 tablet (800 mg total) by mouth 3 (three) times daily.    naproxen (EC NAPROSYN) 500 MG EC tablet Take 1 tablet (500 mg total) by mouth 2 (two) times daily with meals.    omeprazole (PRILOSEC) 40 MG capsule Take 1 capsule (40 mg total) by mouth every morning. (Patient taking differently: Take 40 mg by mouth daily as needed. )    fexofenadine (ALLEGRA) 180 MG tablet Take 1 tablet (180 mg total) by mouth once daily. (Patient taking differently: Take 180 mg by mouth daily as needed. )     No current facility-administered medications for this visit.        No Known Allergies    Family History   Problem Relation Age of Onset    Heart disease Mother     Cancer Father     Stroke Sister        Social History     Social History    Marital status: Single     Spouse name: N/A    Number of children: N/A    Years of education: N/A     Occupational History    Not on file.     Social History Main Topics    Smoking status: Never Smoker    Smokeless tobacco: Never Used    Alcohol use No    Drug use: No    Sexual activity: Not on file     Other Topics Concern    Not on file     Social History Narrative    No narrative on file       Chief Complaint:   Chief Complaint   Patient presents with    Knee Pain     bilateral knee pain      interval history: This is a 55-year-old male seen in consultation for Dr. Wilcox.  Patient is right-hand dominant complains of left shoulder pain.  This been ongoing now for about 2 months.  Patient has a history of a rotator cuff repair but several years ago.  Patient has  good range of motion but a lot of aches and pains with reaching up or behind his back.  No recent treatment.  Rates the pain today as a 0 out of 10 but up to a 6 out of 10 with activity.  Patient also complains of left knee pain again.  Pain is located along the medial aspect of his knee.    I saw him for this a couple years ago and thought it might be vascularly related.  He does have a history of arthritis in both knees.    History of present: Patient's bilateral knees are giving him more trouble again.  Patient was last injected in March 2016.  Left knee feels unstable going downstairs.  Pain over the last 2 weeks.  Right knee is the worst as far as pain.  Pain is medially and posteriorly located.  Pain as a 5 out of 10.    Review of Systems:    Constitution: Negative for chills, fever, and sweats.  Negative for unexplained weight loss.    HENT:  Negative for headaches and blurry vision.    Cardiovascular:Negative for chest pain or irregular heart beat. Negative for hypertension.    Respiratory:  Negative for cough and shortness of breath.    Gastrointestinal: Negative for abdominal pain, heartburn, melena, nausea, and vomitting.    Genitourinary:  Negative bladder incontinence and dysuria.    Musculoskeletal:  See HPI    Neurological: Negative for numbness.    Psychiatric/Behavioral: Negative for depression.  The patient is not nervous/anxious.      Endocrine: Negative for polyuria    Hematologic/Lymphatic: Negative for bleeding problem.  Does not bruise/bleed easily.    Skin: Negative for poor would healing and rash      Physical Examination:    Vital Signs:    Vitals:    06/29/17 1017   BP: (!) 155/101   Pulse: 68       Body mass index is 41.75 kg/m².    This a well-developed, well nourished patient in no acute distress.  They are alert and oriented and cooperative to examination.  Pt. walks without an antalgic gait.        Examination of the left knee shows no rashes or erythema. There are no masses  ecchymosis or effusion. Patient has full range of motion from 0-130°. Patient is nontender to palpation over lateral joint line and mildly tender to palpation over the medial joint line. Patient has a - Lachman exam, - anterior drawer exam, and - posterior drawer exam. - Mary's exam. Knee is stable to varus and valgus stress. 5 out of 5 motor strength. Palpable distal pulses. Intact light touch sensation. Negative Patellofemoral crepitus    Examination of the right knee shows no rashes or erythema. There are no masses ecchymosis or effusion. Patient has full range of motion from 0-130°. Patient is nontender to palpation over lateral joint line and nontender to palpation over the medial joint line. Patient has a - Lachman exam, - anterior drawer exam, and - posterior drawer exam. - Mary's exam. Knee is stable to varus and valgus stress. 5 out of 5 motor strength. Palpable distal pulses. Intact light touch sensation. Negative Patellofemoral crepitus    X-rays:   X-rays of the bilateral knees are ordered and reviewed which show some mild arthritic changes without significant progression     Assessment:: Bilateral knee arthritis    Plan:  Reviewed the diagnosis with the patient today.  We talked about repeating the cortisone injections again had a year ago.  He like to try something different.  I recommended a Synvisc series.  We also got him set up for a hinged knee brace to help with his left knee instability.    We performed a custom orthotic/brace fitting, adjusting and training with the patient. The patient demonstrated understanding and proper care. This was performed for 15 minutes.

## 2017-06-29 NOTE — PROGRESS NOTES
Subjective:       Patient ID: Dayo Goodman is a 56 y.o. male.    Chief Complaint: Follow-up    HPI     Finger injury  Patient is here for evaluation of finger injury that he had approximately 2 weeks ago.  The patient states that he has had pain, on contact of this finger.  The patient states that he has noticed redness to the area.  The patient has not noticed any presence of pus.  The patient states that he has had exquisite tenderness with using this digit.  He has had minimal swelling.  No fever or chills.    Review of Systems   Constitutional: Negative for chills and fever.   Respiratory: Negative for chest tightness and shortness of breath.    Cardiovascular: Negative for chest pain and leg swelling.   Gastrointestinal: Negative for abdominal pain, constipation, diarrhea and vomiting.   Genitourinary: Negative for decreased urine volume, dysuria and hematuria.   Musculoskeletal: Positive for arthralgias.        Finger pain.   Neurological: Negative for weakness and light-headedness.       Objective:      Physical Exam   Constitutional: He appears well-developed and well-nourished. No distress.   Obese male in no acute distress.   HENT:   Head: Normocephalic and atraumatic.   Mouth/Throat: Oropharynx is clear and moist. No oropharyngeal exudate.   Eyes: EOM are normal. Pupils are equal, round, and reactive to light.   Neck: Normal range of motion. Neck supple. No thyromegaly present.   Cardiovascular: Normal rate, regular rhythm, normal heart sounds and intact distal pulses.    Pulmonary/Chest: Effort normal and breath sounds normal. No respiratory distress. He has no wheezes.   Abdominal: Soft. Bowel sounds are normal. He exhibits no distension and no mass. There is no tenderness.   Musculoskeletal: He exhibits no edema.   Mild erythema to distal 4th finger with superficial laceration. No evidence of active infectious process.   Lymphadenopathy:     He has no cervical adenopathy.   Neurological: He is alert.    Skin: Skin is warm. No rash noted. No erythema.   Psychiatric: He has a normal mood and affect. His behavior is normal.   Vitals reviewed.      Assessment:       1. Finger injury, unspecified laterality, subsequent encounter        Plan:       1. Finger injury, unspecified laterality, subsequent encounter  Patient has good healing to this finger at this point.  The patient has been advised that he will need to continue to keep the wound clean/dry.  Make sure to avoid trauma to the area.    Follow-up as needed.    Portions of this note were created using Dragon voice recognition software. There may be voice recognition errors found in the text, and attempts were made to correct these errors prior to signature    Juan Kumar MD    Family Medicine  6/29/2017

## 2017-07-05 DIAGNOSIS — M25.561 ACUTE PAIN OF RIGHT KNEE: Primary | ICD-10-CM

## 2017-07-05 RX ORDER — HYDROCODONE BITARTRATE AND ACETAMINOPHEN 5; 325 MG/1; MG/1
1 TABLET ORAL EVERY 6 HOURS PRN
Qty: 40 TABLET | Refills: 0 | Status: SHIPPED | OUTPATIENT
Start: 2017-07-05 | End: 2017-07-23

## 2017-07-05 NOTE — TELEPHONE ENCOUNTER
----- Message from Chago Carranza sent at 7/3/2017  7:55 AM CDT -----  Contact: Dayo  Patient is inquiring the results of his knee x-ray. He said his knee is killing him and Ibuprofen is not easing the pain any longer. He is wearing the knee brace and crutchesas well. States he cannot put any weight on his knee. Please call back [phone]. He said if you miss him, he will call right back.  Thanks!

## 2017-07-05 NOTE — TELEPHONE ENCOUNTER
Patient was seen in the office last week for bilateral knee pain. He is set up with Synvisc next week. He is stating that he is now unable to put any weight on his right knee and having lots of swelling. Do you want to do any further testing for the right knee?

## 2017-07-08 ENCOUNTER — HOSPITAL ENCOUNTER (OUTPATIENT)
Dept: RADIOLOGY | Facility: HOSPITAL | Age: 57
Discharge: HOME | End: 2017-07-08
Attending: ORTHOPAEDIC SURGERY
Payer: COMMERCIAL

## 2017-07-08 DIAGNOSIS — M25.561 ACUTE PAIN OF RIGHT KNEE: ICD-10-CM

## 2017-07-08 PROCEDURE — 73721 MRI JNT OF LWR EXTRE W/O DYE: CPT | Mod: 26,RT,, | Performed by: RADIOLOGY

## 2017-07-08 PROCEDURE — 73721 MRI JNT OF LWR EXTRE W/O DYE: CPT | Mod: TC,RT

## 2017-07-10 ENCOUNTER — TELEPHONE (OUTPATIENT)
Dept: ORTHOPEDICS | Facility: CLINIC | Age: 57
End: 2017-07-10

## 2017-07-10 NOTE — TELEPHONE ENCOUNTER
Advised patient that Dr. Vargas will be able to go over his MRI in detail on Thursday at his appointment but the report does show that he has a tear of the medical meniscus. H verbalized understanding.

## 2017-07-10 NOTE — TELEPHONE ENCOUNTER
----- Message from Nora Fernandez sent at 7/10/2017  3:06 PM CDT -----  Contact: self [phone]  Please call him with the results of the MRI.  Thank you!

## 2017-07-13 ENCOUNTER — OFFICE VISIT (OUTPATIENT)
Dept: ORTHOPEDICS | Facility: CLINIC | Age: 57
End: 2017-07-13
Payer: COMMERCIAL

## 2017-07-13 VITALS
SYSTOLIC BLOOD PRESSURE: 128 MMHG | WEIGHT: 315 LBS | BODY MASS INDEX: 38.36 KG/M2 | HEART RATE: 70 BPM | DIASTOLIC BLOOD PRESSURE: 70 MMHG | HEIGHT: 76 IN

## 2017-07-13 DIAGNOSIS — M17.0 PRIMARY OSTEOARTHRITIS OF BOTH KNEES: Primary | ICD-10-CM

## 2017-07-13 PROCEDURE — 20610 DRAIN/INJ JOINT/BURSA W/O US: CPT | Mod: 50,S$GLB,, | Performed by: ORTHOPAEDIC SURGERY

## 2017-07-13 PROCEDURE — 99499 UNLISTED E&M SERVICE: CPT | Mod: S$GLB,,, | Performed by: ORTHOPAEDIC SURGERY

## 2017-07-13 PROCEDURE — 99999 PR PBB SHADOW E&M-EST. PATIENT-LVL III: CPT | Mod: PBBFAC,,, | Performed by: ORTHOPAEDIC SURGERY

## 2017-07-13 NOTE — PROCEDURES
Large Joint Aspiration/Injection  Date/Time: 7/13/2017 9:54 AM  Performed by: FREDERICK GALLEGOS  Authorized by: FREDERICK GALLEGOS     Consent Done?:  Yes (Verbal)  Indications:  Pain  Procedure site marked: Yes    Timeout: Prior to procedure the correct patient, procedure, and site was verified      Location:  Knee  Site:  L knee and R knee  Prep: Patient was prepped and draped in usual sterile fashion    Needle size:  20 G  Approach:  Anterolateral  Medications:  48 mg hylan g-f 20 48 mg/6 mL; 48 mg hylan g-f 20 48 mg/6 mL  Patient tolerance:  Patient tolerated the procedure well with no immediate complications

## 2017-07-13 NOTE — PROGRESS NOTES
Past Medical History:   Diagnosis Date    Arthritis     Cyst, Baker's knee     L    GERD (gastroesophageal reflux disease)     Kidney stones        Past Surgical History:   Procedure Laterality Date    kidney stone removal      KNEE SURGERY      PERIPHERAL ARTERIAL STENT GRAFT      leg    SHOULDER SURGERY         Current Outpatient Prescriptions   Medication Sig    hydrocodone-acetaminophen 5-325mg (NORCO) 5-325 mg per tablet Take 1 tablet by mouth every 6 (six) hours as needed for Pain.    ibuprofen (ADVIL,MOTRIN) 800 MG tablet Take 1 tablet (800 mg total) by mouth 3 (three) times daily.    naproxen (EC NAPROSYN) 500 MG EC tablet Take 1 tablet (500 mg total) by mouth 2 (two) times daily with meals.    fexofenadine (ALLEGRA) 180 MG tablet Take 1 tablet (180 mg total) by mouth once daily. (Patient taking differently: Take 180 mg by mouth daily as needed. )     No current facility-administered medications for this visit.        No Known Allergies    Family History   Problem Relation Age of Onset    Heart disease Mother     Cancer Father     Stroke Sister        Social History     Social History    Marital status: Single     Spouse name: N/A    Number of children: N/A    Years of education: N/A     Occupational History    Not on file.     Social History Main Topics    Smoking status: Never Smoker    Smokeless tobacco: Never Used    Alcohol use No    Drug use: No    Sexual activity: Not on file     Other Topics Concern    Not on file     Social History Narrative    No narrative on file       Chief Complaint:   Chief Complaint   Patient presents with    Knee Pain     R knee mri results    Knee Pain     B knee synvisc 1/3      Interval history: This is a 56-year-old male seen in consultation for Dr. Wilcox.  Patient is right-hand dominant complains of left shoulder pain.  This been ongoing now for about 2 months.  Patient has a history of a rotator cuff repair but several years ago.  Patient  has good range of motion but a lot of aches and pains with reaching up or behind his back.  No recent treatment.  Rates the pain today as a 0 out of 10 but up to a 6 out of 10 with activity.  Patient also complains of left knee pain again.  Pain is located along the medial aspect of his knee.    I saw him for this a couple years ago and thought it might be vascularly related.  He does have a history of arthritis in both knees.    History of present: Patient's bilateral knees are giving him more trouble again.  MRI of the right knee showed a medial meniscal tear.  Pain as a 3 out of 10.  Here for Synvisc 1.    Review of Systems:    Constitution: Negative for chills, fever, and sweats.  Negative for unexplained weight loss.    HENT:  Negative for headaches and blurry vision.    Cardiovascular:Negative for chest pain or irregular heart beat. Negative for hypertension.    Respiratory:  Negative for cough and shortness of breath.    Gastrointestinal: Negative for abdominal pain, heartburn, melena, nausea, and vomitting.    Genitourinary:  Negative bladder incontinence and dysuria.    Musculoskeletal:  See HPI    Neurological: Negative for numbness.    Psychiatric/Behavioral: Negative for depression.  The patient is not nervous/anxious.      Endocrine: Negative for polyuria    Hematologic/Lymphatic: Negative for bleeding problem.  Does not bruise/bleed easily.    Skin: Negative for poor would healing and rash      Physical Examination:    Vital Signs:    There were no vitals filed for this visit.    Body mass index is 41.87 kg/m².    This a well-developed, well nourished patient in no acute distress.  They are alert and oriented and cooperative to examination.  Pt. walks without an antalgic gait.        Examination of the left knee shows no rashes or erythema. There are no masses ecchymosis or effusion. Patient has full range of motion from 0-130°. Patient is nontender to palpation over lateral joint line and mildly  tender to palpation over the medial joint line. Patient has a - Lachman exam, - anterior drawer exam, and - posterior drawer exam. - Mary's exam. Knee is stable to varus and valgus stress. 5 out of 5 motor strength. Palpable distal pulses. Intact light touch sensation. Negative Patellofemoral crepitus    Examination of the right knee shows no rashes or erythema. There are no masses ecchymosis or effusion. Patient has full range of motion from 0-130°. Patient is nontender to palpation over lateral joint line and nontender to palpation over the medial joint line. Patient has a - Lachman exam, - anterior drawer exam, and - posterior drawer exam. - Mary's exam. Knee is stable to varus and valgus stress. 5 out of 5 motor strength. Palpable distal pulses. Intact light touch sensation. Negative Patellofemoral crepitus    X-rays:   X-rays of the bilateral knees are reviewed which show some mild arthritic changes without significant progression    MRI of the right knee:1.  Complex tear of medial meniscus.  2.  Advanced patellofemoral cartilage loss.  3.  Moderate joint effusion with synovitis and Baker's cyst.       Assessment:: Bilateral knee arthritis  Right medial meniscal tear    Plan:  Reviewed the findings with the patient today.  I recommended injection with Synvisc of both knees.  Patient elected for Synvisc 1 injections today.  Follow-up as needed.

## 2017-07-14 ENCOUNTER — TELEPHONE (OUTPATIENT)
Dept: ORTHOPEDICS | Facility: CLINIC | Age: 57
End: 2017-07-14

## 2017-07-14 NOTE — TELEPHONE ENCOUNTER
----- Message from Stacey M Lefort sent at 7/14/2017 12:27 PM CDT -----  Contact: Patient  Patient would like a callback - he has questions about his pain and if he can take ibuprofen. He can be reached at [phone]. Thank you.

## 2017-07-23 ENCOUNTER — HOSPITAL ENCOUNTER (EMERGENCY)
Facility: HOSPITAL | Age: 57
Discharge: HOME | End: 2017-07-23
Attending: EMERGENCY MEDICINE
Payer: COMMERCIAL

## 2017-07-23 VITALS
BODY MASS INDEX: 38.36 KG/M2 | HEIGHT: 76 IN | OXYGEN SATURATION: 100 % | WEIGHT: 315 LBS | SYSTOLIC BLOOD PRESSURE: 131 MMHG | RESPIRATION RATE: 16 BRPM | DIASTOLIC BLOOD PRESSURE: 79 MMHG | TEMPERATURE: 98 F | HEART RATE: 57 BPM

## 2017-07-23 DIAGNOSIS — S00.91XA ABRASION OF HEAD, INITIAL ENCOUNTER: ICD-10-CM

## 2017-07-23 DIAGNOSIS — M54.50 ACUTE BILATERAL LOW BACK PAIN WITHOUT SCIATICA: ICD-10-CM

## 2017-07-23 DIAGNOSIS — S09.90XA CLOSED HEAD INJURY, INITIAL ENCOUNTER: Primary | ICD-10-CM
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
## 2017-07-23 PROCEDURE — 25000003 PHARM REV CODE 250: Performed by: PHYSICIAN ASSISTANT

## 2017-07-23 PROCEDURE — 96372 THER/PROPH/DIAG INJ SC/IM: CPT

## 2017-07-23 PROCEDURE — 63600175 PHARM REV CODE 636 W HCPCS: Performed by: PHYSICIAN ASSISTANT

## 2017-07-23 PROCEDURE — 99284 EMERGENCY DEPT VISIT MOD MDM: CPT | Mod: 25

## 2017-07-23 RX ORDER — KETOROLAC TROMETHAMINE 30 MG/ML
30 INJECTION, SOLUTION INTRAMUSCULAR; INTRAVENOUS
Status: COMPLETED | OUTPATIENT
Start: 2017-07-23 | End: 2017-07-23

## 2017-07-23 RX ORDER — OMEPRAZOLE 40 MG/1
40 CAPSULE, DELAYED RELEASE ORAL DAILY
COMMUNITY
End: 2018-02-27 | Stop reason: SDUPTHER

## 2017-07-23 RX ORDER — METHOCARBAMOL 500 MG/1
1000 TABLET, FILM COATED ORAL
Status: COMPLETED | OUTPATIENT
Start: 2017-07-23 | End: 2017-07-23

## 2017-07-23 RX ORDER — METHOCARBAMOL 750 MG/1
1500 TABLET, FILM COATED ORAL 3 TIMES DAILY
Qty: 30 TABLET | Refills: 0 | Status: SHIPPED | OUTPATIENT
Start: 2017-07-23 | End: 2017-07-28

## 2017-07-23 RX ORDER — CYCLOBENZAPRINE HCL 5 MG
5 TABLET ORAL 3 TIMES DAILY PRN
Qty: 15 TABLET | Refills: 0 | Status: SHIPPED | OUTPATIENT
Start: 2017-07-23 | End: 2017-07-23 | Stop reason: ALTCHOICE

## 2017-07-23 RX ADMIN — KETOROLAC TROMETHAMINE 30 MG: 30 INJECTION, SOLUTION INTRAMUSCULAR; INTRAVENOUS at 12:07

## 2017-07-23 RX ADMIN — METHOCARBAMOL 1000 MG: 500 TABLET ORAL at 12:07

## 2017-07-23 NOTE — ED NOTES
APPEARANCE: Alert, oriented and in no acute distress.  CARDIAC: Bradycardic rate and regular rhythm, no murmur heard.   PERIPHERAL VASCULAR: peripheral pulses present. Normal cap refill. No edema. Warm to touch.    RESPIRATORY:Normal rate and effort, breath sounds clear bilaterally throughout chest. Respirations are equal and unlabored no obvious signs of distress.  GASTRO: soft, bowel sounds normal, no tenderness, no abdominal distention.  MUSC: Limited ROM due to weakness. No bony tenderness or soft tissue tenderness. No obvious deformity.  SKIN: Skin is warm and dry, normal skin turgor, mucous membranes moist.  NEURO: 5/5 strength major flexors/extensors bilaterally. Sensory intact to light touch bilaterally. Christiano coma scale: eyes open spontaneously-4, oriented & converses-5, obeys commands-6. No neurological abnormalities.   MENTAL STATUS: awake, alert and aware of environment.  EYE: PERRL, both eyes: pupils brisk and reactive to light. Normal size.  ENT: EARS: no obvious drainage. NOSE: no active bleeding.   Pt arrived via EMS after having a slip and fall at work. He hit the back of his head and lost consciousness. Alert and oriented at this time with c-collar in place. Denies any dizziness or blurred vision.

## 2017-07-23 NOTE — ED PROVIDER NOTES
Encounter Date: 7/23/2017       History     Chief Complaint   Patient presents with    Fall     States slipped and fell on wet surface onto back. Hit head on concrete surface. Presents awake, alert, oriented with c/o headache and right knee pain. States h/o chronic right knee pain.      56-year-old male with past medical history of chronic bilateral knee pain, obesity, peripheral vascular disease, and GERD presents the ED with complaints of headache and low back pain after a ground-level fall.  [place] and had a misstep and water causing him to slip and fall back with impact to the back of the head and low back.  He reports brief witnessed LOC.  He states that he was able to get up with some assistance and was able to ambulate a few steps was some discomfort of the affected areas.  He states that he was evaluated by EMS and the cervical collar was placed at that time although he denies any cervical spine pain.  He does continue with mild headache and some dizziness that is exacerbated with positional changes.  He denies any nausea, vomiting, numbness, tingling, bowel or bladder incontinence, lethargy, seizures or other locations of pain at this time.           Review of patient's allergies indicates:  No Known Allergies  Past Medical History:   Diagnosis Date    Arthritis     Cyst, Baker's knee     L    GERD (gastroesophageal reflux disease)     Kidney stones     PVD (peripheral vascular disease)      Past Surgical History:   Procedure Laterality Date    kidney stone removal      KNEE SURGERY      PERIPHERAL ARTERIAL STENT GRAFT      leg    SHOULDER SURGERY       Family History   Problem Relation Age of Onset    Heart disease Mother     Cancer Father     Stroke Sister      Social History   Substance Use Topics    Smoking status: Never Smoker    Smokeless tobacco: Never Used    Alcohol use No     Review of Systems   Constitutional: Negative for fever.   HENT: Negative for sore  throat.    Eyes: Negative for visual disturbance.   Respiratory: Negative for shortness of breath.    Cardiovascular: Negative for chest pain.   Gastrointestinal: Negative for nausea and vomiting.   Genitourinary: Negative for flank pain.   Musculoskeletal: Positive for arthralgias (chronic right knee pain) and back pain. Negative for neck pain and neck stiffness.   Skin: Negative for rash.   Neurological: Positive for dizziness (posterural) and headaches. Negative for weakness, light-headedness and numbness.   Hematological: Does not bruise/bleed easily.   Psychiatric/Behavioral: Negative for confusion.       Physical Exam     Initial Vitals [07/23/17 1113]   BP Pulse Resp Temp SpO2   139/78 (!) 50 16 97.9 °F (36.6 °C) 100 %      MAP       98.33         Physical Exam    Nursing note and vitals reviewed.  Constitutional: Vital signs are normal. He appears well-developed and well-nourished. He is cooperative.  Non-toxic appearance. He does not appear ill. He appears distressed. Cervical collar in place.   HENT:   Head: Normocephalic. Head is with abrasion.       Right Ear: Tympanic membrane normal.   Left Ear: Tympanic membrane normal.   Nose: Nose normal.   Eyes: Conjunctivae, EOM and lids are normal. Pupils are equal, round, and reactive to light.   Neck: Trachea normal and normal range of motion. Neck supple. No stridor present. Muscular tenderness present. No spinous process tenderness present.   Cardiovascular: Normal rate and regular rhythm.   Pulmonary/Chest: Breath sounds normal. No respiratory distress.   Abdominal: Soft. Normal appearance.   Musculoskeletal:        Lumbar back: He exhibits tenderness, pain and spasm.        Back:    Neurological: He is alert and oriented to person, place, and time. No sensory deficit. GCS eye subscore is 4. GCS verbal subscore is 5. GCS motor subscore is 6.   Skin: Skin is warm, dry and intact. No rash noted.   Psychiatric: He has a normal mood and affect. His speech is  normal and behavior is normal. Thought content normal.         ED Course   Procedures  Labs Reviewed - No data to display      Imaging Results          CT Cervical Spine Without Contrast (Final result)  Result time 07/23/17 12:53:21    Final result by Tianna Olson MD (07/23/17 12:53:21)                 Impression:        Moderate cervical spondylosis with spinal canal encroachment C3-4 and multilevel neural foraminal narrowing.    No fracture or traumatic malalignment.      Electronically signed by: TIANNA OLSON MD  Date:     07/23/17  Time:    12:53              Narrative:    CT cervical spine    07/23/17 12:03:38    Accession# [id number]    CLINICAL INDICATION: 56 year old M with pain, fall     TECHNIQUE:   Axial CT images obtained throughout the region of the cervical spine without intravenous contrast. Axial, sagittal, and coronal reconstructions were performed.    COMPARISON: Radiograph 03/25/2012    FINDINGS:     The vertebral bodies demonstrate no evidence of fracture or osseous destructive process.    Straightening of the normal cervical lordosis without significant spondylolisthesis.    Moderate cervical spondylosis with loss of disc height at C3-4, C5-6 and C6-7. Degenerative endplate changes and a few Schmorl's nodes. No bony spinal canal stenosis but there does appear to some degree of spinal canal narrowing from a prominent disc protrusion at C3-4. uncovertebral and facet hypertrophy result in multilevel neural foraminal narrowing.      Limited evaluation of the intraspinal contents demonstrates no hematoma or mass.Paraspinal soft tissues exhibit no acute abnormalities.                             CT Head Without Contrast (Final result)  Result time 07/23/17 12:20:33    Final result by Tianna Olson MD (07/23/17 12:20:33)                 Impression:         Mild extracranial soft tissue swelling without evidence of underlying fracture or acute intracranial pathology.      Electronically signed  by: TIANNA OLSON MD  Date:     07/23/17  Time:    12:20              Narrative:    CT head without contrast    07/23/17 12:03:34    Accession# [id number]    CLINICAL INDICATION: 56 year old M with  fall    TECHNIQUE: Axial CT images obtained throughout the region of the head without the use of intravenous contrast. Axial, sagittal and coronal reconstructions were performed.    COMPARISON: No priors.    FINDINGS:    The ventricles are normal in size without evidence of hydrocephalus.    The brain appears within normal limits. No parenchymal mass, hemorrhage, edema or major vascular distribution infarct.    No extra-axial blood or fluid collections.    Mild extracranial soft tissue swelling posteriorly near the vertex. The cranium appears intact. Mastoid air cells and paranasal sinuses are essentially clear.                             X-Ray Lumbar Spine Ap And Lateral (Final result)  Result time 07/23/17 12:11:06    Final result by Jarad Marrero MD (07/23/17 12:11:06)                 Impression:     No fracture identified.      Electronically signed by: Jarad Marrero MD  Date:     07/23/17  Time:    12:11              Narrative:    Comparison: None.    Findings:2 views obtained  . Bilateral iliac stents noted. SI joints unremarkable.No fractures identified. The alignment is within normal limits.  No evidence of a marrow replacement process.                                   Medical Decision Making:   Initial Assessment:   56-year-old male with past medical history of chronic bilateral knee pain, obesity, peripheral vascular disease, and GERD presents the ED with complaints of headache and low back pain after a ground-level fall.  [place] and had a misstep and water causing him to slip and fall back with impact to the back of the head and low back.  He reports brief witnessed LOC.  He states that he was able to get up with some assistance and was able to ambulate a few steps was some discomfort of  the affected areas.  He states that he was evaluated by EMS and the cervical collar was placed at that time although he denies any cervical spine pain.  He does continue with mild headache and some dizziness that is exacerbated with positional changes.  He denies any nausea, vomiting, numbness, tingling, bowel or bladder incontinence, lethargy, seizures or other locations of pain at this time.  Physical exam reveals alert and oriented ×3 male with a Christiano Coma Score of 15 the presents in some mild distress due to pain.  Cervical collar in place with no spiny tenderness at this time.  Head significant for mild soft tissue swelling and abrasion of the left occipital region with no bony crepitus or deformity.  TMs free of hemotympanum, no Maradiaga, no raccoon sign remaining head appears atraumatic.  Lungs clear to auscultation; heart regular rate and rhythm.  Abdomen soft and nontender.  Fair range of motion of all extremities with strength equal bilaterally.  TTP of the lower paraspinal and lumbar region with no step-off, deformity, ecchymosis or edema.  Neurovascularly intact.  Differential Diagnosis:   Acute intracranial process, fracture, dislocation, abrasion, hematoma  Clinical Tests:   Radiological Study: Ordered and Reviewed  ED Management:  CT of head and neck are unremarkable for acute abnormalities with exception of mild soft tissue swelling consistent with impact region.  Patient is noted to have moderate cervical spondylosis with no acute abnormality at this time.  X-ray of lumbar with no acute process.  Some pain reduction with Toradol and Robaxin in the ED.  Cervical collar removed by me.  We will send patient home with Robaxin as he states that he has a prescription for Motrin and Norco for his chronic pain.  Discuss signs and symptoms to watch for return to ED. Patient was cautioned on when to return to ED. Patient verbalized understanding and agreement with the treatment plan                Attending  Attestation:     Physician Attestation Statement for NP/PA:   I have conducted a face to face encounter with this patient in addition to the NP/PA, due to NP/PA Request    Other NP/PA Attestation Additions:    History of Present Illness: 56M with slip and fall from standing; struck back of head on ground.  No LOC.  Head pain and lower back pain.    Medical Decision Making: No symptoms of concussion.  Neurologically intact.  Abrasion without laceration to head.  No fx or subluxation of lumbar xray.  Treat with robaxin; continue motrin and norco.                 ED Course     Clinical Impression:   The primary encounter diagnosis was Closed head injury, initial encounter. Diagnoses of Fall, Acute bilateral low back pain without sciatica, and Abrasion of head, initial encounter were also pertinent to this visit.                           JOSEPH Mccabe  07/26/17 1100       Stacie Link MD  07/30/17 5295

## 2017-07-24 ENCOUNTER — TELEPHONE (OUTPATIENT)
Dept: FAMILY MEDICINE | Facility: CLINIC | Age: 57
End: 2017-07-24

## 2017-07-24 NOTE — TELEPHONE ENCOUNTER
----- Message from Eli Wan sent at 7/24/2017  9:44 AM CDT -----  Contact:   call //[phone]    Placed a call to  Pod // please  Call

## 2017-07-24 NOTE — TELEPHONE ENCOUNTER
----- Message from Chago Carranza sent at 7/24/2017  9:27 AM CDT -----  Contact: Dayo  Patient had a slip and fall at work yesterday- He hit the back of his head. He has since then been experiencing dizziness when he stand up and moving around. He would like to have his cat scan looked over. Wants to also know if it may be normal? Does he need to see a neurologist? Please call back on [phone].    Thanks!

## 2017-07-25 NOTE — TELEPHONE ENCOUNTER
----- Message from Melissa Rodriguez sent at 7/25/2017  7:21 AM CDT -----  Patient states he missed a call from office this morning, please call [phone] (home)

## 2017-07-25 NOTE — TELEPHONE ENCOUNTER
Spoke with patient , informed him of Dr. 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 He has an appointment scheduled already 9/12, I will check with beth/israel to see if he can be seen next week for ED follow up and then keep his appointment with us.

## 2017-07-31 ENCOUNTER — TELEPHONE (OUTPATIENT)
Dept: FAMILY MEDICINE | Facility: CLINIC | Age: 57
End: 2017-07-31

## 2017-07-31 NOTE — TELEPHONE ENCOUNTER
----- Message from Niurka Topete sent at 7/31/2017  2:23 PM CDT -----  Contact: [phone]  Patient is returning nurse's phone call.  Please call patient back at [phone].

## 2017-07-31 NOTE — LETTER
June 29, 2017      [facility]  [address] [place] [address]  Phone: [phone]  Fax: [phone]       Patient: Dayo Goodman   YOB: 1960  Date of Visit: 06/29/2017    To Whom It May Concern:    Dayo Sabillon was at [facility] on 06/29/2017. Patient has been under my care for his recent injury to his hand. I have evaluated this patient for this condition and he has been cleared for work related activity.      Sincerely,    Juan Kumar MD      Problem: Safety  Goal: Will remain free from falls  Intervention: Assess risk factors for falls  Pt ambulates without assistance. Call light within reach. Calls for assistance as needed.       Problem: Knowledge Deficit  Goal: Knowledge of disease process/condition, treatment plan, diagnostic tests, and medications will improve  Pt instructed on pain management, IS, ambulating, diet and call light    Problem: Pain Management  Goal: Pain level will decrease to patient’s comfort goal  Has no c/o pain at this time.

## 2017-07-31 NOTE — TELEPHONE ENCOUNTER
Spoke with patient about his appointment(follow up ED)  that was suppose to have scheduled under work tiffanie comp. Patient stated he called his job to get this appointment scheduled but had a hard time reaching the right person to assist him. He was told someone  will contact him, but has not yet. I informed patient that he should give them a call. Patient agreed and stated he will call me back.

## 2017-07-31 NOTE — TELEPHONE ENCOUNTER
Spoke with patient , he stated he spoke with his job (workmans comp) regarding his ED follow up appointment, he was told by them that he will have to see a neurologist because he is still having dizzy spells (room spinning), they will call him with the appointment in a couple of days,     1. patient would like to know what he can do in the mean time?      Please advise

## 2017-08-01 ENCOUNTER — TELEPHONE (OUTPATIENT)
Dept: NEUROLOGY | Facility: CLINIC | Age: 57
End: 2017-08-01

## 2017-08-01 NOTE — TELEPHONE ENCOUNTER
Call received from Renetta Hillman at central scheduling desk requesting [facility] appt for pt regarding recent concussion. I advised that [facility] and [facility] are primary for the listed Dx. Unfortunately, they are booked out through end of Sept. Appt has been offered/accepted with Dr. Carr at [facility] for 8/16/17 @4pm.  on the line also accepting of the above appt for the pt.

## 2017-08-03 ENCOUNTER — TELEPHONE (OUTPATIENT)
Dept: FAMILY MEDICINE | Facility: CLINIC | Age: 57
End: 2017-08-03

## 2017-08-03 NOTE — TELEPHONE ENCOUNTER
Informed patient Dr. Kumar's staff is out of the office today and Joseph will return his call regarding the workman's comp paper work when she returns.       ----- Message from Elana Dodd sent at 8/3/2017  1:50 PM CDT -----  Contact: Patient  Dayo, [phone] Patient is returning the nurse's call. Please advise. Thanks.

## 2017-08-11 ENCOUNTER — TELEPHONE (OUTPATIENT)
Dept: FAMILY MEDICINE | Facility: CLINIC | Age: 57
End: 2017-08-11

## 2017-08-11 NOTE — TELEPHONE ENCOUNTER
----- Message from Grace Sorenson sent at 8/10/2017  2:00 PM CDT -----  Contact: self   [phone]  Patient called asking to speak with the nurse regarding workmen comp.Thanks!

## 2017-08-16 ENCOUNTER — OFFICE VISIT (OUTPATIENT)
Dept: NEUROLOGY | Facility: CLINIC | Age: 57
End: 2017-08-16
Payer: OTHER MISCELLANEOUS

## 2017-08-16 DIAGNOSIS — Z76.89 ENCOUNTER TO ESTABLISH CARE: Primary | ICD-10-CM

## 2017-08-16 PROCEDURE — 99499 UNLISTED E&M SERVICE: CPT | Mod: S$GLB,,, | Performed by: PSYCHIATRY & NEUROLOGY

## 2017-08-21 ENCOUNTER — DOCUMENTATION ONLY (OUTPATIENT)
Dept: FAMILY MEDICINE | Facility: CLINIC | Age: 57
End: 2017-08-21

## 2017-08-21 ENCOUNTER — OFFICE VISIT (OUTPATIENT)
Dept: FAMILY MEDICINE | Facility: CLINIC | Age: 57
End: 2017-08-21
Payer: COMMERCIAL

## 2017-08-21 VITALS
HEIGHT: 76 IN | DIASTOLIC BLOOD PRESSURE: 79 MMHG | HEART RATE: 65 BPM | WEIGHT: 315 LBS | BODY MASS INDEX: 38.36 KG/M2 | SYSTOLIC BLOOD PRESSURE: 117 MMHG

## 2017-08-21 DIAGNOSIS — S06.0X1D CONCUSSION, WITH LOC OF 30 MIN OR LESS, SUBSEQUENT ENCOUNTER: Primary | ICD-10-CM

## 2017-08-21 PROCEDURE — 99213 OFFICE O/P EST LOW 20 MIN: CPT | Mod: S$GLB,,, | Performed by: FAMILY MEDICINE

## 2017-08-21 PROCEDURE — 3008F BODY MASS INDEX DOCD: CPT | Mod: S$GLB,,, | Performed by: FAMILY MEDICINE

## 2017-08-21 PROCEDURE — 99999 PR PBB SHADOW E&M-EST. PATIENT-LVL III: CPT | Mod: PBBFAC,,, | Performed by: FAMILY MEDICINE

## 2017-08-21 NOTE — PATIENT INSTRUCTIONS
Weight Management: Getting Started  Healthy bodies come in all shapes and sizes. Not all bodies are made to be thin. For some people, a healthy weight is higher than the average weight listed on weight charts. Your healthcare provider can help you decide on a healthy weight for you.    Reasons to lose weight  Losing weight can help with some health problems, such as high blood pressure, heart disease, diabetes, sleep apnea, and arthritis. You may also feel more energy.  Set your long-term goal  Your goal doesn't even have to be a specific weight. You may decide on a fitness goal (such as being able to walk 10 miles a week), or a health goal (such as lowering your blood pressure). Choose a goal that is measurable and reasonable, so you know when you've reached it. A goal of reaching a BMI of less than 25 is not always reasonable (or possible).   Make an action plan  Habits dont change overnight. Setting your goals too high can leave you feeling discouraged if you cant reach them. Be realistic. Choose one or two small changes you can make now. Set an action plan for how you are going to make these changes. When you can stick to this plan, keep making a few more small changes. Taking small steps will help you stay on the path to success.  Track your progress  Write down your goals. Then, keep a daily record of your progress. Write down what you eat and how active you are. This record lets you look back on how much youve done. It may also help when youre feeling frustrated. Reward yourself for success. Even if you dont reach every goal, give yourself credit for what you do get done.  Get support  Encouragement from others can help make losing weight easier. Ask your family members and friends for support. They may even want to join you. Also look to your healthcare provider, registered dietitian, and  for help. Your local hospital can give you more information about nutrition, exercise, and  weight loss.  Date Last Reviewed: 1/31/2016  © 5762-0869 [employer] [address] [place] [address], [place] [address]. All rights reserved. This information is not intended as a substitute for professional medical care. Always follow your healthcare professional's instructions.

## 2017-08-21 NOTE — PROGRESS NOTES
Subjective:       Patient ID: Dayo Goodman is a 56 y.o. male.    Chief Complaint: Follow-up (head injury)    HPI     Concussion F/u  Pt has been seen by neurology for this on last week.   He see's Dr. Escobar.  Pt states that he continues to have dizzy spells on a daily occasion.   According to the patient's neurologist, he has an inner ear problem which is causing his symptoms to linger.   Pt states that he will need medical clearance in order to return to work.   He continues to have occasionally blurry vision and HAs.    No additional episodes of syncope.   He has been referred to ENT by his neurologist.     Review of Systems   Constitutional: Negative for chills and fever.   Respiratory: Negative for chest tightness and shortness of breath.    Cardiovascular: Negative for chest pain and leg swelling.   Gastrointestinal: Negative for abdominal pain, constipation, diarrhea and vomiting.   Genitourinary: Negative for decreased urine volume, dysuria and hematuria.   Musculoskeletal: Positive for arthralgias and neck pain. Negative for back pain.   Neurological: Positive for headaches. Negative for weakness and light-headedness.       Objective:      Physical Exam   Constitutional: He appears well-developed and well-nourished. No distress.   Obese male in NAD.   HENT:   Head: Normocephalic and atraumatic.   Mouth/Throat: Oropharynx is clear and moist. No oropharyngeal exudate.   Eyes: EOM are normal. Pupils are equal, round, and reactive to light.   Neck: Normal range of motion. Neck supple. No thyromegaly present.   Cardiovascular: Normal rate, regular rhythm, normal heart sounds and intact distal pulses.    Pulmonary/Chest: Effort normal and breath sounds normal. No respiratory distress. He has no wheezes.   Abdominal: Soft. Bowel sounds are normal. He exhibits no distension and no mass. There is no tenderness.   Musculoskeletal: He exhibits no edema.   Lymphadenopathy:     He has no cervical adenopathy.    Neurological: He is alert. No cranial nerve deficit.   CN2-12 grossly intact. No focal weakness. Normal sensation, throughout.    Skin: Skin is warm. No rash noted. No erythema.   Psychiatric: He has a normal mood and affect. His behavior is normal.   Vitals reviewed.      Assessment:       1. Concussion, with LOC of 30 min or less, subsequent encounter        Plan:       1. Concussion, with LOC of 30 min or less, subsequent encounter  Pt will have neurologist fax records.   I cannot clear this patient to return to work at this time.   Mental/physical rest advised.   Continue to follow up with neurology and ENT.   Repeat eval in 1 month.     Portions of this note were created using Dragon voice recognition software. There may be voice recognition errors found in the text, and attempts were made to correct these errors prior to signature    Juan Kumar MD    Family Medicine  8/21/2017

## 2017-08-21 NOTE — PROGRESS NOTES
Pre-Visit Chart Review  For Appointment Scheduled on (8/21)    Health Maintenance Due   Topic Date Due    Colonoscopy  08/26/2009    Influenza Vaccine  08/01/2017

## 2017-08-24 DIAGNOSIS — Z12.11 COLON CANCER SCREENING: ICD-10-CM

## 2017-09-13 ENCOUNTER — TELEPHONE (OUTPATIENT)
Dept: FAMILY MEDICINE | Facility: CLINIC | Age: 57
End: 2017-09-13

## 2017-09-13 NOTE — TELEPHONE ENCOUNTER
----- Message from Elana Dodd sent at 9/13/2017 10:07 AM CDT -----  Contact: Keo with [facility] [employer] at [phone]  Keo with [employer] [employer] at [phone], Calling to make sure the office knows that this patient is workman's comp. Please advise. Thanks.

## 2017-09-14 NOTE — TELEPHONE ENCOUNTER
"Per routing comment from dr Kumar: "  I spoke to Mrs. Saldana about this patient and he will have this addressed on his visit with me soon. (Routing comment)     "

## 2017-09-21 ENCOUNTER — TELEPHONE (OUTPATIENT)
Dept: FAMILY MEDICINE | Facility: CLINIC | Age: 57
End: 2017-09-21

## 2017-09-21 NOTE — TELEPHONE ENCOUNTER
----- Message from Micha Soto sent at 9/21/2017  9:35 AM CDT -----  Pt has questions in ref to who will release him to go back to work, ent of pcp, call patient [phone]

## 2017-09-21 NOTE — TELEPHONE ENCOUNTER
Patient  Has been seen by neurologist, ENT and Dr Kumar for concussion. This is a workers Comp case. Who should clear patient to return to work?

## 2017-09-22 NOTE — TELEPHONE ENCOUNTER
"Per Dr Kumar's routing comment: "Patient will need clearance by his neurologist.  This is because he has been seeing them for his concussion. (Routing comment) "  Left voicemail to inform patient.  "

## 2017-10-24 ENCOUNTER — TELEPHONE (OUTPATIENT)
Dept: FAMILY MEDICINE | Facility: CLINIC | Age: 57
End: 2017-10-24

## 2017-10-24 NOTE — TELEPHONE ENCOUNTER
----- Message from Grace Sorenson sent at 10/23/2017  1:46 PM CDT -----  Contact: self  [phone]  Patient called asking if should schedule to see the doctor.Thanks!

## 2017-10-26 ENCOUNTER — OFFICE VISIT (OUTPATIENT)
Dept: ORTHOPEDICS | Facility: CLINIC | Age: 57
End: 2017-10-26
Payer: COMMERCIAL

## 2017-10-26 VITALS
WEIGHT: 315 LBS | BODY MASS INDEX: 38.36 KG/M2 | SYSTOLIC BLOOD PRESSURE: 135 MMHG | DIASTOLIC BLOOD PRESSURE: 75 MMHG | HEIGHT: 76 IN | HEART RATE: 46 BPM

## 2017-10-26 DIAGNOSIS — M79.645 FINGER PAIN, LEFT: ICD-10-CM

## 2017-10-26 DIAGNOSIS — M71.22 BAKER'S CYST, LEFT: Primary | ICD-10-CM

## 2017-10-26 PROCEDURE — 99999 PR PBB SHADOW E&M-EST. PATIENT-LVL III: CPT | Mod: PBBFAC,,, | Performed by: ORTHOPAEDIC SURGERY

## 2017-10-26 PROCEDURE — 99213 OFFICE O/P EST LOW 20 MIN: CPT | Mod: S$GLB,,, | Performed by: ORTHOPAEDIC SURGERY

## 2017-10-26 RX ORDER — IBUPROFEN 800 MG/1
800 TABLET ORAL 3 TIMES DAILY
Qty: 40 TABLET | Refills: 0 | Status: SHIPPED | OUTPATIENT
Start: 2017-10-26 | End: 2018-02-14 | Stop reason: SDUPTHER

## 2017-10-26 NOTE — TELEPHONE ENCOUNTER
----- Message from Hamletjonatan Jacques sent at 10/26/2017  2:47 PM CDT -----  Contact: Patient  States that he is calling back to see if a doctor has been contacted to drain the cyst.  The system was down during the appointment at the time.  And an Ultra Sound was also suppse to be put in the system.  Can you please call him back at [phone].  Thank you

## 2017-10-26 NOTE — PROGRESS NOTES
Past Medical History:   Diagnosis Date    Arthritis     Cyst, Baker's knee     L    GERD (gastroesophageal reflux disease)     Kidney stones     PVD (peripheral vascular disease)        Past Surgical History:   Procedure Laterality Date    kidney stone removal      KNEE SURGERY      PERIPHERAL ARTERIAL STENT GRAFT      leg    SHOULDER SURGERY         Current Outpatient Prescriptions   Medication Sig    ibuprofen (ADVIL,MOTRIN) 800 MG tablet Take 1 tablet (800 mg total) by mouth 3 (three) times daily.    omeprazole (PRILOSEC) 40 MG capsule Take 40 mg by mouth once daily.     No current facility-administered medications for this visit.        No Known Allergies    Family History   Problem Relation Age of Onset    Heart disease Mother     Cancer Father     Stroke Sister        Social History     Social History    Marital status: Single     Spouse name: N/A    Number of children: N/A    Years of education: N/A     Occupational History    Not on file.     Social History Main Topics    Smoking status: Never Smoker    Smokeless tobacco: Never Used    Alcohol use No    Drug use: No    Sexual activity: Not on file     Other Topics Concern    Not on file     Social History Narrative    No narrative on file       Chief Complaint:   Chief Complaint   Patient presents with    Left Knee - Pain      Interval history: This is a 57-year-old male seen in consultation for Dr. Wilcox.  Patient is right-hand dominant complains of left shoulder pain.  This been ongoing now for about 2 months.  Patient has a history of a rotator cuff repair but several years ago.  Patient has good range of motion but a lot of aches and pains with reaching up or behind his back.  No recent treatment.  Rates the pain today as a 0 out of 10 but up to a 6 out of 10 with activity.  Patient also complains of left knee pain again.  Pain is located along the medial aspect of his knee.    I saw him for this a couple years ago and thought  it might be vascularly related.  He does have a history of arthritis in both knees.    History of present: Patient's left knee is giving him more trouble.  It started about a week ago.  He denies new injury or trauma.  Pain and swelling in the back of his knee.  Difficulty flexing his knee.  Pain with standing, walking, getting up from a sitting position.  He rates the pain as a 6 out of 10.    Review of Systems:    Constitution: Negative for chills, fever, and sweats.  Negative for unexplained weight loss.    HENT:  Negative for headaches and blurry vision.    Cardiovascular:Negative for chest pain or irregular heart beat. Negative for hypertension.    Respiratory:  Negative for cough and shortness of breath.    Gastrointestinal: Negative for abdominal pain, heartburn, melena, nausea, and vomitting.    Genitourinary:  Negative bladder incontinence and dysuria.    Musculoskeletal:  See HPI    Neurological: Negative for numbness.    Psychiatric/Behavioral: Negative for depression.  The patient is not nervous/anxious.      Endocrine: Negative for polyuria    Hematologic/Lymphatic: Negative for bleeding problem.  Does not bruise/bleed easily.    Skin: Negative for poor would healing and rash      Physical Examination:    Vital Signs:    Vitals:    10/26/17 0903   BP: 135/75   Pulse: (!) 46       Body mass index is 41.51 kg/m².    This a well-developed, well nourished patient in no acute distress.  They are alert and oriented and cooperative to examination.  Pt. walks without an antalgic gait.        Examination of the left knee shows no rashes or erythema. There is more prominent Baker's cyst present. Patient has full range of motion from 0-130°. Patient is nontender to palpation over lateral joint line and mildly tender to palpation over the medial joint line. Patient has a - Lachman exam, - anterior drawer exam, and - posterior drawer exam. - Mary's exam. Knee is stable to varus and valgus stress. 5 out of 5  motor strength. Palpable distal pulses. Intact light touch sensation. Negative Patellofemoral crepitus      X-rays:   X-rays of the bilateral knees are reviewed which show some mild arthritic changes without significant progression    MRI of the right knee:1.  Complex tear of medial meniscus.  2.  Advanced patellofemoral cartilage loss.  3.  Moderate joint effusion with synovitis and Baker's cyst.       Assessment:: Bilateral knee arthritis  Right medial meniscal tear  Left Baker's cyst    Plan:  I reviewed the findings with him today.  It sounds like his Baker cyst is more symptomatic.  I recommended consultation with Dr. Claros for drainage of the Baker cyst and possible injection of his knee.

## 2017-10-27 ENCOUNTER — TELEPHONE (OUTPATIENT)
Dept: FAMILY MEDICINE | Facility: CLINIC | Age: 57
End: 2017-10-27

## 2017-10-27 NOTE — TELEPHONE ENCOUNTER
----- Message from Yenni Hanna sent at 10/26/2017  2:50 PM CDT -----  Contact: Patient  Patient states that his left shoulder is in a lot of pain and he is asking if you can call something in for him.  It is for the ibuprofen (ADVIL,MOTRIN) 800 MG tablets and if any questions, you can call him back at [phone].  Thank you      [facility] [id number] - [place] [address]ILKINS  [address] AT [facility] [address] [place]  [address]  [place] [address]  Phone: [phone] Fax: [phone]

## 2017-11-01 ENCOUNTER — INITIAL CONSULT (OUTPATIENT)
Dept: PHYSICAL MEDICINE AND REHAB | Facility: CLINIC | Age: 57
End: 2017-11-01
Payer: COMMERCIAL

## 2017-11-01 VITALS
BODY MASS INDEX: 38.36 KG/M2 | HEIGHT: 76 IN | SYSTOLIC BLOOD PRESSURE: 128 MMHG | WEIGHT: 315 LBS | DIASTOLIC BLOOD PRESSURE: 77 MMHG | HEART RATE: 68 BPM

## 2017-11-01 DIAGNOSIS — M71.22 BAKER'S CYST OF KNEE, LEFT: ICD-10-CM

## 2017-11-01 DIAGNOSIS — G89.29 CHRONIC PAIN OF LEFT KNEE: Primary | ICD-10-CM

## 2017-11-01 DIAGNOSIS — M25.562 CHRONIC PAIN OF LEFT KNEE: Primary | ICD-10-CM

## 2017-11-01 PROCEDURE — 99202 OFFICE O/P NEW SF 15 MIN: CPT | Mod: S$GLB,,, | Performed by: PHYSICAL MEDICINE & REHABILITATION

## 2017-11-01 PROCEDURE — 99999 PR PBB SHADOW E&M-EST. PATIENT-LVL II: CPT | Mod: PBBFAC,,, | Performed by: PHYSICAL MEDICINE & REHABILITATION

## 2017-11-01 NOTE — PROGRESS NOTES
[facility]    CHIEF COMPLAINT:   Chief Complaint   Patient presents with    Knee Pain     left knee pain     HISTORY OF PRESENT ILLNESS: Dayo Goodman is a 57 y.o. male who presents to me for the first time for treatment of a left knee Baker's cyst.  Has been treated by orthopedic surgery, Dr. Vargas with hyaluronic acid injections for his chronic left knee pain.  His injections have been beneficial.  He was recently seen by Dr. Vargas and he was having some posterior left knee pain and swelling at that time.  He was sent today for ultrasound-guided aspiration of the Alcantar's cyst.  Mr. Goodman however reports resolution of his pain over recent days.  He also feels the cyst has gone down.  He notes his pain as a 0 today.    Review of Systems   Constitutional: Negative for fever.   HENT: Negative for drooling.    Eyes: Negative for discharge.   Respiratory: Negative for choking.    Cardiovascular: Negative for chest pain.   Genitourinary: Negative for flank pain.   Skin: Negative for wound.   Allergic/Immunologic: Negative for immunocompromised state.   Neurological: Negative for tremors and syncope.   Psychiatric/Behavioral: Negative for behavioral problems.     Past Medical History:   Past Medical History:   Diagnosis Date    Arthritis     Cyst, Baker's knee     L    GERD (gastroesophageal reflux disease)     Kidney stones     PVD (peripheral vascular disease)        Past Surgical History:   Past Surgical History:   Procedure Laterality Date    kidney stone removal      KNEE SURGERY      PERIPHERAL ARTERIAL STENT GRAFT      leg    SHOULDER SURGERY         Family History:   Family History   Problem Relation Age of Onset    Heart disease Mother     Cancer Father     Stroke Sister        Medications:   Current Outpatient Prescriptions on File Prior to Visit   Medication Sig Dispense Refill    ibuprofen (ADVIL,MOTRIN) 800 MG tablet Take 1 tablet (800 mg total) by mouth 3 (three) times  "daily. 40 tablet 0    omeprazole (PRILOSEC) 40 MG capsule Take 40 mg by mouth once daily.       No current facility-administered medications on file prior to visit.        Allergies: Review of patient's allergies indicates:  No Known Allergies    Social History:   Social History     Social History    Marital status: Single     Spouse name: N/A    Number of children: N/A    Years of education: N/A     Social History Main Topics    Smoking status: Never Smoker    Smokeless tobacco: Never Used    Alcohol use No    Drug use: No    Sexual activity: Not Asked     Other Topics Concern    None     Social History Narrative    None     PHYSICAL EXAMINATION:   General    Vitals:    11/01/17 1009   BP: 128/77   Pulse: 68   Weight: (!) 154.7 kg (341 lb)   Height: 6' 4" (1.93 m)     Constitutional: Oriented to person, place, and time. No apparent distress. Appears well-developed and well-nourished. Pleasant.  HENT:   Head: Normocephalic and atraumatic.   Eyes: Right eye exhibits no discharge. Left eye exhibits no discharge. No scleral icterus.   Pulmonary/Chest: Effort normal. No respiratory distress.   Abdominal: There is no guarding.   Neurological: Alert and oriented to person, place, and time.   Psychiatric: Behavior is normal.   Left Knee Exam     Tenderness   The patient is experiencing no tenderness.         Range of Motion   Extension: 0   Flexion: 140     Tests   Mary:  Medial - negative Lateral - negative  Drawer:       Anterior - negative     Posterior - negative  Varus: negative  Valgus: negative  Patellar Apprehension: negative    Other   Erythema: absent  Sensation: normal  Pulse: present  Swelling: mild  Effusion: no effusion present        INSPECTION: There is no swelling, ecchymoses, erythema or gross deformity about the left knee.  GAIT/DYNAMIC: Normal    Imaging  X-ray of the bilateral knees from 6/29/2017: Bilateral tricompartmental knee osteoarthritis, similar to previous. This is most " pronounced in the medial compartment of the left knee where moderate severity is noted.    Data Reviewed: X-ray    Supportive Actions: Independent visualization of images or test specimens    ASSESSMENT:   1. Chronic pain of left knee    2. Baker's cyst of knee, left      PLAN:     1. Time was spent reviewing the above diagnosis in depth with Dayo today, including acute management and rehabilitation.     2.  Mr. Goodman reports that his left knee pain is resolved.  We discussed potential aspiration of this cyst, however without pain or significant swelling we discussed that this procedure is optional at this point.  After discussion he wishes to return if or when his knee becomes more symptomatic.    3. RTC prn.    The above note was completed, in part, with the aid of Dragon dictation software/hardware. Translation errors may be present.\

## 2018-01-11 ENCOUNTER — TELEPHONE (OUTPATIENT)
Dept: FAMILY MEDICINE | Facility: CLINIC | Age: 58
End: 2018-01-11

## 2018-01-11 NOTE — TELEPHONE ENCOUNTER
----- Message from Sommer Pacheco sent at 1/11/2018  1:13 PM CST -----  Contact: pt   Pt would likle a prescripstion on anything for Coughing, body aching, nasal congestion, chills.    [facility] [id number] - [place], [place] - [address] [place]    Pt can be reached at [phone].    When ready for .

## 2018-01-11 NOTE — TELEPHONE ENCOUNTER
Informed patient he must be seen for proper evaluation and treatment. Patient scheduled and will proceed to urgent care if symptoms worsen before appointment.

## 2018-01-15 ENCOUNTER — DOCUMENTATION ONLY (OUTPATIENT)
Dept: FAMILY MEDICINE | Facility: CLINIC | Age: 58
End: 2018-01-15

## 2018-01-15 NOTE — PROGRESS NOTES
Pre-Visit Chart Review  For Appointment Scheduled on 1/16/2017    Health Maintenance Due   Topic Date Due    Colonoscopy  08/26/2009    Influenza Vaccine  08/01/2017

## 2018-01-16 ENCOUNTER — DOCUMENTATION ONLY (OUTPATIENT)
Dept: FAMILY MEDICINE | Facility: CLINIC | Age: 58
End: 2018-01-16

## 2018-01-16 ENCOUNTER — OFFICE VISIT (OUTPATIENT)
Dept: FAMILY MEDICINE | Facility: CLINIC | Age: 58
End: 2018-01-16
Payer: COMMERCIAL

## 2018-01-16 VITALS
DIASTOLIC BLOOD PRESSURE: 80 MMHG | TEMPERATURE: 98 F | SYSTOLIC BLOOD PRESSURE: 121 MMHG | HEIGHT: 76 IN | BODY MASS INDEX: 38.36 KG/M2 | OXYGEN SATURATION: 97 % | HEART RATE: 65 BPM | WEIGHT: 315 LBS

## 2018-01-16 DIAGNOSIS — R09.82 POST-NASAL DRIP: ICD-10-CM

## 2018-01-16 DIAGNOSIS — R05.9 COUGH: ICD-10-CM

## 2018-01-16 DIAGNOSIS — J06.9 VIRAL UPPER RESPIRATORY ILLNESS: Primary | ICD-10-CM

## 2018-01-16 DIAGNOSIS — Z12.11 COLON CANCER SCREENING: ICD-10-CM

## 2018-01-16 DIAGNOSIS — K21.9 GASTROESOPHAGEAL REFLUX DISEASE WITHOUT ESOPHAGITIS: ICD-10-CM

## 2018-01-16 DIAGNOSIS — E66.01 OBESITY, CLASS III, BMI 40-49.9 (MORBID OBESITY): ICD-10-CM

## 2018-01-16 PROCEDURE — 99213 OFFICE O/P EST LOW 20 MIN: CPT | Mod: 25,S$GLB,, | Performed by: NURSE PRACTITIONER

## 2018-01-16 PROCEDURE — 96372 THER/PROPH/DIAG INJ SC/IM: CPT | Mod: S$GLB,,, | Performed by: FAMILY MEDICINE

## 2018-01-16 PROCEDURE — 99999 PR PBB SHADOW E&M-EST. PATIENT-LVL III: CPT | Mod: PBBFAC,,, | Performed by: NURSE PRACTITIONER

## 2018-01-16 RX ORDER — BETAMETHASONE SODIUM PHOSPHATE AND BETAMETHASONE ACETATE 3; 3 MG/ML; MG/ML
6 INJECTION, SUSPENSION INTRA-ARTICULAR; INTRALESIONAL; INTRAMUSCULAR; SOFT TISSUE
Status: COMPLETED | OUTPATIENT
Start: 2018-01-16 | End: 2018-01-16

## 2018-01-16 RX ORDER — PSEUDOEPHEDRINE HCL 30 MG
30 TABLET ORAL EVERY 4 HOURS PRN
Qty: 24 TABLET | Refills: 0
Start: 2018-01-16 | End: 2018-01-26

## 2018-01-16 RX ADMIN — BETAMETHASONE SODIUM PHOSPHATE AND BETAMETHASONE ACETATE 6 MG: 3; 3 INJECTION, SUSPENSION INTRA-ARTICULAR; INTRALESIONAL; INTRAMUSCULAR; SOFT TISSUE at 10:01

## 2018-01-16 NOTE — PROGRESS NOTES
Subjective:       Patient ID: Dayo Goodman is a 57 y.o. male.    Chief Complaint: Cough and Generalized Body Aches    HPI   Chief Complaint  Chief Complaint   Patient presents with    Cough    Generalized Body Aches       HPI  Dayo Goodman is a 57 y.o. male with medical diagnoses as listed in the medical history and problem list that presents with c/o cough and body aches.   has had symptoms for 5-6 days and has taken OTC theraflu, Nyquil, and Mucinex.   has had little fever with chills but none in past 2 days, feels like he is improving. Has a cough that is rarely productive of white phlegm that he feels is coming from the back of his throat. Symptoms are constant with no aggravating factors identified. Patient regularly treated for GERD with prilosec 40 mg daily and for arthritis pain with ibuprofen 800 mg TID po PRN.  BMI today is 41.99 and patient has gained 4 pounds since last visit.  Established patient with last clinic appointment 8/21/2017.        PAST MEDICAL HISTORY:  Past Medical History:   Diagnosis Date    Arthritis     Cyst, Baker's knee     L    GERD (gastroesophageal reflux disease)     Kidney stones     PVD (peripheral vascular disease)        PAST SURGICAL HISTORY:  Past Surgical History:   Procedure Laterality Date    kidney stone removal      KNEE SURGERY      PERIPHERAL ARTERIAL STENT GRAFT      leg    SHOULDER SURGERY         SOCIAL HISTORY:  Social History     Social History    Marital status: Single     Spouse name: N/A    Number of children: N/A    Years of education: N/A     Occupational History    Not on file.     Social History Main Topics    Smoking status: Never Smoker    Smokeless tobacco: Never Used    Alcohol use No    Drug use: No    Sexual activity: Not on file     Other Topics Concern    Not on file     Social History Narrative    No narrative on file       FAMILY HISTORY:  Family History   Problem Relation Age of Onset    Heart disease Mother      Cancer Father     Stroke Sister        ALLERGIES AND MEDICATIONS: updated and reviewed.  Review of patient's allergies indicates:  No Known Allergies  Current Outpatient Prescriptions   Medication Sig Dispense Refill    ibuprofen (ADVIL,MOTRIN) 800 MG tablet Take 1 tablet (800 mg total) by mouth 3 (three) times daily. 40 tablet 0    omeprazole (PRILOSEC) 40 MG capsule Take 40 mg by mouth once daily.      pseudoephedrine (SUDAFED) 30 MG tablet Take 1 tablet (30 mg total) by mouth every 4 (four) hours as needed for Congestion. 24 tablet 0     Current Facility-Administered Medications   Medication Dose Route Frequency Provider Last Rate Last Dose    betamethasone acetate-betamethasone sodium phosphate injection 6 mg  6 mg Intramuscular 1 time in Clinic/HOD Jamee Santillan NP               Review of Systems   Constitutional: Positive for appetite change, chills and fatigue. Negative for fever.        Chills, decreased appetite, and fatigue   HENT: Positive for congestion and postnasal drip. Negative for ear pain, rhinorrhea and sore throat.    Eyes: Negative for visual disturbance.   Respiratory: Positive for cough and shortness of breath. Negative for wheezing.         Cough, mostly non-productive, short of breath with exertion like climbing stairs   Cardiovascular: Negative for chest pain and palpitations.   Gastrointestinal: Positive for diarrhea. Negative for abdominal pain, constipation, nausea and vomiting.        Had little bit of diarrhea a few times   Genitourinary: Negative for difficulty urinating.   Musculoskeletal: Positive for myalgias.        Had some body aches   Skin: Negative for rash and wound.   Neurological: Negative for dizziness, numbness and headaches.   Hematological: Negative for adenopathy.   Psychiatric/Behavioral: Positive for sleep disturbance. Negative for decreased concentration and suicidal ideas. The patient is not nervous/anxious.         Sleep disturbed by feeling of lump  "of congestion to back of throat. Has been drinking a lot of water, powerade.        Objective:       Vitals:    01/16/18 0908   BP: 121/80   BP Location: Right arm   Patient Position: Sitting   BP Method: Large (Automatic)   Pulse: 65   Temp: 98.4 °F (36.9 °C)   TempSrc: Oral   SpO2: 97%   Weight: (!) 156.5 kg (345 lb)   Height: 6' 4" (1.93 m)     Physical Exam   Constitutional: He is oriented to person, place, and time. Vital signs are normal. He appears well-developed. No distress.   HENT:   Head: Normocephalic and atraumatic.   Right Ear: Tympanic membrane, external ear and ear canal normal.   Left Ear: Tympanic membrane, external ear and ear canal normal.   Nose: Nose normal. No mucosal edema. Right sinus exhibits no maxillary sinus tenderness and no frontal sinus tenderness. Left sinus exhibits no maxillary sinus tenderness and no frontal sinus tenderness.   Mouth/Throat: Mucous membranes are normal. Oropharyngeal exudate present.   Yellow post nasal drip noted to posterior pharynx   Eyes: Conjunctivae and lids are normal. Right conjunctiva is not injected. Left conjunctiva is not injected.   Neck: Normal carotid pulses present. Carotid bruit is not present.   Cardiovascular: Normal rate, regular rhythm, S1 normal, S2 normal, normal heart sounds, intact distal pulses and normal pulses.    No murmur heard.  Pulses:       Carotid pulses are 2+ on the right side, and 2+ on the left side.       Radial pulses are 2+ on the right side, and 2+ on the left side.   Pulmonary/Chest: Effort normal and breath sounds normal. No respiratory distress. He has no decreased breath sounds. He has no wheezes. He has no rhonchi. He has no rales.   Lymphadenopathy:        Head (right side): No submental, no submandibular, no tonsillar, no preauricular, no posterior auricular and no occipital adenopathy present.        Head (left side): No submental, no submandibular, no tonsillar, no preauricular, no posterior auricular and no " occipital adenopathy present.     He has no cervical adenopathy.   Neurological: He is alert and oriented to person, place, and time. He has normal strength.   Skin: Skin is warm, dry and intact. Capillary refill takes less than 2 seconds. He is not diaphoretic. No pallor.   Psychiatric: He has a normal mood and affect. His speech is normal and behavior is normal. Judgment and thought content normal. Cognition and memory are normal.   Nursing note and vitals reviewed.      Assessment:       1. Viral upper respiratory illness    2. Cough    3. Post-nasal drip    4. Gastroesophageal reflux disease without esophagitis    5. Obesity, Class III, BMI 40-49.9 (morbid obesity)        Plan:   Dayo was seen today for cough and generalized body aches.    Diagnoses and all orders for this visit:    Viral upper respiratory illness  -     pseudoephedrine (SUDAFED) 30 MG tablet; Take 1 tablet (30 mg total) by mouth every 4 (four) hours as needed for Congestion.  -     betamethasone acetate-betamethasone sodium phosphate injection 6 mg; Inject 1 mL (6 mg total) into the muscle one time.  - Educational handouts provided. Viral Upper Respiratory Illness    Cough  -     betamethasone acetate-betamethasone sodium phosphate injection 6 mg; Inject 1 mL (6 mg total) into the muscle one time.    Post-nasal drip  -     pseudoephedrine (SUDAFED) 30 MG tablet; Take 1 tablet (30 mg total) by mouth every 4 (four) hours as needed for Congestion.  -     betamethasone acetate-betamethasone sodium phosphate injection 6 mg; Inject 1 mL (6 mg total) into the muscle one time.    Gastroesophageal reflux disease without esophagitis   Controlled with omeprazole, takes as needed, heartburn with red sauce  Obesity, Class III, BMI 40-49.9 (morbid obesity)   Discussed, patient actively trying to loose weight with exercise and portion control   Weight loss recommended with well balanced diet and portion controlled meals and increased activity.   Colon cancer  screening  -     Fecal Immunochemical Test (iFOBT); Future  - Discussed colonoscopy, refuses, agrees to [facility]    Follow-up if symptoms worsen or fail to improve.     Patient readiness: acceptance and barriers:none    During the course of the visit the patient was educated and counseled about the following:     Obesity:   General weight loss/lifestyle modification strategies discussed (elicit support from others; identify saboteurs; non-food rewards, etc).  Diet interventions: moderate (500 kCal/d) deficit diet.  Informal exercise measures discussed, e.g. taking stairs instead of elevator.  Regular aerobic exercise program discussed.    Goals: Obesity: Reduce calorie intake and BMI    Did patient meet goals/outcomes: No    The following self management tools provided: declined    Patient Instructions (the written plan) was given to the patient/family.     Time spent with patient: 30 minutes    Barriers to medications present (no )    Adverse reactions to current medications (no)    Over the counter medications reviewed (Yes)

## 2018-01-16 NOTE — PROGRESS NOTES
Pt. Identified using  and name.Procedure was explained, pt. Verbalized understanding. Betamethasone 6mg dministered IM in the right upper outer gluteus  using sterile technique, no bleeding noted at injection site.  Pt. tolerated procedure well.

## 2018-01-16 NOTE — PATIENT INSTRUCTIONS
Viral Upper Respiratory Illness (Adult)  You have a viral upper respiratory illness (URI), which is another term for the common cold. This illness is contagious during the first few days. It is spread through the air by coughing and sneezing. It may also be spread by direct contact (touching the sick person and then touching your own eyes, nose, or mouth). Frequent handwashing will decrease risk of spread. Most viral illnesses go away within 7 to 10 days with rest and simple home remedies. Sometimes the illness may last for several weeks. Antibiotics will not kill a virus, and they are generally not prescribed for this condition.    Home care  · If symptoms are severe, rest at home for the first 2 to 3 days. When you resume activity, don't let yourself get too tired.  · Avoid being exposed to cigarette smoke (yours or others).  · You may use acetaminophen or ibuprofen to control pain and fever, unless another medicine was prescribed. (Note: If you have chronic liver or kidney disease, have ever had a stomach ulcer or gastrointestinal bleeding, or are taking blood-thinning medicines, talk with your healthcare provider before using these medicines.) Aspirin should never be given to anyone under 18 years of age who is ill with a viral infection or fever. It may cause severe liver or brain damage.  · Your appetite may be poor, so a light diet is fine. Avoid dehydration by drinking 6 to 8 glasses of fluids per day (water, soft drinks, juices, tea, or soup). Extra fluids will help loosen secretions in the nose and lungs.  · Over-the-counter cold medicines will not shorten the length of time youre sick, but they may be helpful for the following symptoms: cough, sore throat, and nasal and sinus congestion. (Note: Do not use decongestants if you have high blood pressure.)  Follow-up care  Follow up with your healthcare provider, or as advised.  When to seek medical advice  Call your healthcare provider right away if any  of these occur:  · Cough with lots of colored sputum (mucus)  · Severe headache; face, neck, or ear pain  · Difficulty swallowing due to throat pain  · Fever of 100.4°F (38°C)  Call 911, or get immediate medical care  Call emergency services right away if any of these occur:  · Chest pain, shortness of breath, wheezing, or difficulty breathing  · Coughing up blood  · Inability to swallow due to throat pain  Date Last Reviewed: 9/13/2015  © 6865-4119 [employer]. [address] [place] [address]. All rights reserved. This information is not intended as a substitute for professional medical care. Always follow your healthcare professional's instructions.

## 2018-01-25 ENCOUNTER — TELEPHONE (OUTPATIENT)
Dept: FAMILY MEDICINE | Facility: CLINIC | Age: 58
End: 2018-01-25

## 2018-01-25 NOTE — TELEPHONE ENCOUNTER
Inform the patient that he can take milk of magnesia which is over-the-counter as well, if the MiraLAX is not working.

## 2018-01-25 NOTE — TELEPHONE ENCOUNTER
Spoke with patient , he has been feeling constipated and bloated, he was wondering what medication (OTC) would be good for him to take, however he did take miralax last night for the first time but it only relieved the bloating a little. Please advise

## 2018-01-25 NOTE — TELEPHONE ENCOUNTER
----- Message from Jimena Vick sent at 1/24/2018  3:37 PM CST -----  Contact: Patient  Patient is calling to speak with someone because he has been having stomach problems and wanted to see if it would be okay to take some over the counter laxatives.  He has a bloating feeling.  Call Back#[phone]  Thanks

## 2018-02-14 RX ORDER — IBUPROFEN 800 MG/1
800 TABLET ORAL 3 TIMES DAILY
Qty: 40 TABLET | Refills: 0 | Status: SHIPPED | OUTPATIENT
Start: 2018-02-14 | End: 2018-05-03 | Stop reason: SDUPTHER

## 2018-02-14 RX ORDER — IBUPROFEN 800 MG/1
800 TABLET ORAL 3 TIMES DAILY
Qty: 40 TABLET | Refills: 0 | Status: CANCELLED | OUTPATIENT
Start: 2018-02-14

## 2018-02-14 NOTE — TELEPHONE ENCOUNTER
----- Message from Giseleaureliano Monzon sent at 2/14/2018 12:04 PM CST -----  Patient states that he has a appointment on 2-22 but need a refill on Rx Ibuprofen 800 mg.  Please send into [facility] [address].  Any questions call patient at [phone].

## 2018-02-22 ENCOUNTER — OFFICE VISIT (OUTPATIENT)
Dept: ORTHOPEDICS | Facility: CLINIC | Age: 58
End: 2018-02-22
Payer: COMMERCIAL

## 2018-02-22 VITALS
BODY MASS INDEX: 38.36 KG/M2 | WEIGHT: 315 LBS | SYSTOLIC BLOOD PRESSURE: 132 MMHG | HEIGHT: 76 IN | DIASTOLIC BLOOD PRESSURE: 71 MMHG | HEART RATE: 60 BPM

## 2018-02-22 DIAGNOSIS — M17.0 PRIMARY OSTEOARTHRITIS OF BOTH KNEES: Primary | ICD-10-CM

## 2018-02-22 PROCEDURE — 99999 PR PBB SHADOW E&M-EST. PATIENT-LVL III: CPT | Mod: PBBFAC,,, | Performed by: ORTHOPAEDIC SURGERY

## 2018-02-22 PROCEDURE — 3008F BODY MASS INDEX DOCD: CPT | Mod: S$GLB,,, | Performed by: ORTHOPAEDIC SURGERY

## 2018-02-22 PROCEDURE — 99213 OFFICE O/P EST LOW 20 MIN: CPT | Mod: S$GLB,,, | Performed by: ORTHOPAEDIC SURGERY

## 2018-02-22 NOTE — PROGRESS NOTES
Past Medical History:   Diagnosis Date    Arthritis     Cyst, Baker's knee     L    GERD (gastroesophageal reflux disease)     Kidney stones     PVD (peripheral vascular disease)        Past Surgical History:   Procedure Laterality Date    kidney stone removal      KNEE SURGERY      PERIPHERAL ARTERIAL STENT GRAFT      leg    SHOULDER SURGERY         Current Outpatient Prescriptions   Medication Sig    ibuprofen (ADVIL,MOTRIN) 800 MG tablet Take 1 tablet (800 mg total) by mouth 3 (three) times daily.    omeprazole (PRILOSEC) 40 MG capsule Take 40 mg by mouth once daily.     No current facility-administered medications for this visit.        No Known Allergies    Family History   Problem Relation Age of Onset    Heart disease Mother     Cancer Father     Stroke Sister        Social History     Social History    Marital status: Single     Spouse name: N/A    Number of children: N/A    Years of education: N/A     Occupational History    Not on file.     Social History Main Topics    Smoking status: Never Smoker    Smokeless tobacco: Never Used    Alcohol use No    Drug use: No    Sexual activity: Not on file     Other Topics Concern    Not on file     Social History Narrative    No narrative on file       Chief Complaint:   Chief Complaint   Patient presents with    Right Knee - Pain      Interval history: This is a 57-year-old male seen in consultation for Dr. Wilcox.  Patient is right-hand dominant complains of left shoulder pain.  This been ongoing now for about 2 months.  Patient has a history of a rotator cuff repair but several years ago.  Patient has good range of motion but a lot of aches and pains with reaching up or behind his back.  No recent treatment.  Rates the pain today as a 0 out of 10 but up to a 6 out of 10 with activity.  Patient also complains of left knee pain again.  Pain is located along the medial aspect of his knee.    I saw him for this a couple years ago and thought  it might be vascularly related.  He does have a history of arthritis in both knees.    History of present: Patient's  right knee is starting to bother him a little more.  His last injection was a Synvisc 1 on July 13, 2017.  Pain started again a few weeks ago.  Pain laying down or going up and down steps.  Pain as a 4 out of 10.    Review of Systems:    Constitution: Negative for chills, fever, and sweats.  Negative for unexplained weight loss.    HENT:  Negative for headaches and blurry vision.    Cardiovascular:Negative for chest pain or irregular heart beat. Negative for hypertension.    Respiratory:  Negative for cough and shortness of breath.    Gastrointestinal: Negative for abdominal pain, heartburn, melena, nausea, and vomitting.    Genitourinary:  Negative bladder incontinence and dysuria.    Musculoskeletal:  See HPI    Neurological: Negative for numbness.    Psychiatric/Behavioral: Negative for depression.  The patient is not nervous/anxious.      Endocrine: Negative for polyuria    Hematologic/Lymphatic: Negative for bleeding problem.  Does not bruise/bleed easily.    Skin: Negative for poor would healing and rash      Physical Examination:    Vital Signs:    Vitals:    02/22/18 1111   BP: 132/71   Pulse: 60       Body mass index is 41.99 kg/m².    This a well-developed, well nourished patient in no acute distress.  They are alert and oriented and cooperative to examination.  Pt. walks without an antalgic gait.        Examination of the right knee shows no rashes or erythema.  Patient has full range of motion from 0-130°. Patient is nontender to palpation over lateral joint line and mildly tender to palpation over the medial joint line. Patient has a - Lachman exam, - anterior drawer exam, and - posterior drawer exam. - Mary's exam. Knee is stable to varus and valgus stress. 5 out of 5 motor strength. Palpable distal pulses. Intact light touch sensation. Negative Patellofemoral crepitus.  Pain along  the medial trochlea.      X-rays:   X-rays of the bilateral knees are reviewed which show some mild arthritic changes without significant progression    MRI of the right knee:1.  Complex tear of medial meniscus.  2.  Advanced patellofemoral cartilage loss.  3.  Moderate joint effusion with synovitis and Baker's cyst.       Assessment:: Bilateral knee arthritis  Right medial meniscal tear      Plan:  I reviewed the findings with him today.  We talked about possibly repeating his Synvisc 1 injection.  He will probably do this in a few weeks.  We also talked about possibly getting him an open knee sleeve cannot put as much pressure along the patella.

## 2018-02-27 ENCOUNTER — OFFICE VISIT (OUTPATIENT)
Dept: FAMILY MEDICINE | Facility: CLINIC | Age: 58
End: 2018-02-27
Payer: COMMERCIAL

## 2018-02-27 VITALS
HEIGHT: 76 IN | SYSTOLIC BLOOD PRESSURE: 120 MMHG | BODY MASS INDEX: 38.36 KG/M2 | DIASTOLIC BLOOD PRESSURE: 74 MMHG | WEIGHT: 315 LBS

## 2018-02-27 DIAGNOSIS — Z00.00 WELLNESS EXAMINATION: Primary | ICD-10-CM

## 2018-02-27 DIAGNOSIS — K21.9 GASTROESOPHAGEAL REFLUX DISEASE WITHOUT ESOPHAGITIS: ICD-10-CM

## 2018-02-27 DIAGNOSIS — M17.0 PRIMARY OSTEOARTHRITIS OF BOTH KNEES: ICD-10-CM

## 2018-02-27 DIAGNOSIS — F41.9 ANXIETY: ICD-10-CM

## 2018-02-27 PROBLEM — M25.562 ACUTE PAIN OF BOTH KNEES: Status: RESOLVED | Noted: 2017-01-25 | Resolved: 2018-02-27

## 2018-02-27 PROBLEM — M25.669 DECREASED RANGE OF MOTION (ROM) OF KNEE: Status: RESOLVED | Noted: 2017-01-25 | Resolved: 2018-02-27

## 2018-02-27 PROBLEM — M25.561 ACUTE PAIN OF BOTH KNEES: Status: RESOLVED | Noted: 2017-01-25 | Resolved: 2018-02-27

## 2018-02-27 PROCEDURE — 99396 PREV VISIT EST AGE 40-64: CPT | Mod: S$GLB,,, | Performed by: FAMILY MEDICINE

## 2018-02-27 PROCEDURE — 99999 PR PBB SHADOW E&M-EST. PATIENT-LVL III: CPT | Mod: PBBFAC,,, | Performed by: FAMILY MEDICINE

## 2018-02-27 RX ORDER — TRAMADOL HYDROCHLORIDE 50 MG/1
50 TABLET ORAL EVERY 6 HOURS PRN
Qty: 20 TABLET | Refills: 0 | Status: SHIPPED | OUTPATIENT
Start: 2018-02-27 | End: 2018-03-05 | Stop reason: SDUPTHER

## 2018-02-27 RX ORDER — OMEPRAZOLE 40 MG/1
40 CAPSULE, DELAYED RELEASE ORAL DAILY
Qty: 30 CAPSULE | Refills: 5 | Status: SHIPPED | OUTPATIENT
Start: 2018-02-27 | End: 2019-01-14 | Stop reason: SDUPTHER

## 2018-02-27 NOTE — PATIENT INSTRUCTIONS
Weight Management: Getting Started  Healthy bodies come in all shapes and sizes. Not all bodies are made to be thin. For some people, a healthy weight is higher than the average weight listed on weight charts. Your healthcare provider can help you decide on a healthy weight for you.    Reasons to lose weight  Losing weight can help with some health problems, such as high blood pressure, heart disease, diabetes, sleep apnea, and arthritis. You may also feel more energy.  Set your long-term goal  Your goal doesn't even have to be a specific weight. You may decide on a fitness goal (such as being able to walk 10 miles a week), or a health goal (such as lowering your blood pressure). Choose a goal that is measurable and reasonable, so you know when you've reached it. A goal of reaching a BMI of less than 25 is not always reasonable (or possible).   Make an action plan  Habits dont change overnight. Setting your goals too high can leave you feeling discouraged if you cant reach them. Be realistic. Choose one or two small changes you can make now. Set an action plan for how you are going to make these changes. When you can stick to this plan, keep making a few more small changes. Taking small steps will help you stay on the path to success.  Track your progress  Write down your goals. Then, keep a daily record of your progress. Write down what you eat and how active you are. This record lets you look back on how much youve done. It may also help when youre feeling frustrated. Reward yourself for success. Even if you dont reach every goal, give yourself credit for what you do get done.  Get support  Encouragement from others can help make losing weight easier. Ask your family members and friends for support. They may even want to join you. Also look to your healthcare provider, registered dietitian, and  for help. Your local hospital can give you more information about nutrition, exercise, and  weight loss.  Date Last Reviewed: 1/31/2016  © 2558-8686 [employer]. [address] [place] [address], [place] [address]. All rights reserved. This information is not intended as a substitute for professional medical care. Always follow your healthcare professional's instructions.

## 2018-02-27 NOTE — PROGRESS NOTES
Subjective:       Patient ID: Dayo Goodman is a 57 y.o. male.    Chief Complaint: Follow-up (6month)    HPI     Annual Exam  Pt reports to the clinic for a wellness exam.   Currently, pt is without complaint.   The pt has a medical history which includes morbid obesity.   As far as smoking is concerned, the pt denies.   The pt attempts to maintain a healthy diet and engages in regular exercise.   Consistent seatbelt usage reported.   Pt has no symptoms of depression.     Review of Systems   Constitutional: Negative for chills and fever.   Respiratory: Negative for chest tightness and shortness of breath.    Cardiovascular: Positive for leg swelling. Negative for chest pain.   Gastrointestinal: Negative for abdominal pain, constipation, diarrhea and vomiting.   Genitourinary: Negative for decreased urine volume, dysuria and hematuria.   Musculoskeletal: Positive for arthralgias and neck pain. Negative for back pain.   Neurological: Positive for headaches. Negative for weakness and light-headedness.       Objective:      Physical Exam   Constitutional: He appears well-developed and well-nourished. No distress.   Obese male in no acute distress.     HENT:   Head: Normocephalic and atraumatic.   Mouth/Throat: Oropharynx is clear and moist. No oropharyngeal exudate.   Eyes: EOM are normal. Pupils are equal, round, and reactive to light.   Neck: Normal range of motion. Neck supple. No thyromegaly present.   Cardiovascular: Normal rate, regular rhythm and intact distal pulses.    Pulmonary/Chest: Effort normal. No respiratory distress.   Abdominal: Soft. He exhibits no distension and no mass. There is no tenderness.   Musculoskeletal: He exhibits edema.   Lymphadenopathy:     He has no cervical adenopathy.   Neurological: He is alert.   Skin: Skin is warm. No rash noted. No erythema.   Psychiatric: He has a normal mood and affect. His behavior is normal.   Vitals reviewed.      Assessment:       1. Wellness examination     2. Anxiety    3. Gastroesophageal reflux disease without esophagitis    4. BMI 40.0-44.9, adult    5. Primary osteoarthritis of both knees        Plan:       1. Wellness examination  - Lipid panel; Future  - Basic metabolic panel; Future  - Microalbumin/creatinine urine ratio; Future    2. Anxiety  No changes to management of this condition on today.     3. Gastroesophageal reflux disease without esophagitis  Condition currently stable. No changes to medication regimen on today.   - omeprazole (PRILOSEC) 40 MG capsule; Take 1 capsule (40 mg total) by mouth once daily.  Dispense: 30 capsule; Refill: 5    4. BMI 40.0-44.9, adult  Patient has been advised to continue to maintain a healthy lifestyle, including regular exercise and consuming a well balanced diet.   - Ambulatory Referral to Bariatric Medicine    5. Primary osteoarthritis of both knees  Patient is followed by Ortho for this condition. No changes to management of this condition on today.   - traMADol (ULTRAM) 50 mg tablet; Take 1 tablet (50 mg total) by mouth every 6 (six) hours as needed for Pain.  Dispense: 20 tablet; Refill: 0    Patient readiness: acceptance and barriers:none    During the course of the visit the patient was educated and counseled about the following:     Obesity:   Informal exercise measures discussed, e.g. taking stairs instead of elevator.  Regular aerobic exercise program discussed.    Goals: Obesity: Reduce calorie intake and BMI    Did patient meet goals/outcomes: No    The following self management tools provided: excercise log    Patient Instructions (the written plan) was given to the patient/family.     Time spent with patient: 15 minutes    Barriers to medications present (no )    Adverse reactions to current medications (no)    Over the counter medications reviewed (Yes)    Portions of this note were created using Dragon voice recognition software. There may be voice recognition errors found in the text, and attempts were  made to correct these errors prior to signature    Juan Kumar MD    Family Medicine  2/27/2018

## 2018-03-05 ENCOUNTER — TELEPHONE (OUTPATIENT)
Dept: FAMILY MEDICINE | Facility: CLINIC | Age: 58
End: 2018-03-05

## 2018-03-05 ENCOUNTER — TELEPHONE (OUTPATIENT)
Dept: ORTHOPEDICS | Facility: CLINIC | Age: 58
End: 2018-03-05

## 2018-03-05 DIAGNOSIS — M17.0 PRIMARY OSTEOARTHRITIS OF BOTH KNEES: ICD-10-CM

## 2018-03-05 RX ORDER — TRAMADOL HYDROCHLORIDE 50 MG/1
100 TABLET ORAL EVERY 6 HOURS PRN
Qty: 40 TABLET | Refills: 0 | Status: SHIPPED | OUTPATIENT
Start: 2018-03-05 | End: 2018-03-15

## 2018-03-05 NOTE — TELEPHONE ENCOUNTER
I have increased the patient's tramadol to 100 mg to be taken every 6 hours, as needed for pain.  If this is does not suffice, patient will need to be referred to pain management for further assistance with this.

## 2018-03-05 NOTE — TELEPHONE ENCOUNTER
Called pt regarding below message. Left voicemail with return number.     ----- Message from Melissa Rodriguez sent at 3/5/2018  1:17 PM CST -----  Please call patient in regards to a request for a stronger pain Rx for knee, [phone]

## 2018-03-05 NOTE — TELEPHONE ENCOUNTER
Called pt regarding below message. Pt is requesting new prescription for pain. Pt states  Right knee pain  reports redness/ swelling. Pt states pain is worse at night and early morning. Pt is ok with a topical or oral pain relievers. Informed pt I will send this information to Dr. Kumar and return his call with additional information. Pt verbalized understanding with no further questions.     ----- Message from Gregoria Danielle sent at 3/5/2018  2:40 PM CST -----  Contact: HERMELINDA TARIQ [[id number]]  _  1st Request  _  2nd Request  _  3rd Request        Who: HERMELINDA TARIQ [[id number]]    Why: patient is returning a call     What Number to Call Back: [phone]    When to Expect a call back: (Before the end of the day)   -- if call after 3:00 call back will be tomorrow.

## 2018-03-05 NOTE — TELEPHONE ENCOUNTER
----- Message from Ila Shelton sent at 3/5/2018 11:57 AM CST -----  Patient states he is having a lot of pain in his knee, especially at night, and is requesting medication to assist with pain, please contact patient at [phone]      Thank you

## 2018-03-06 ENCOUNTER — TELEPHONE (OUTPATIENT)
Dept: BARIATRICS | Facility: CLINIC | Age: 58
End: 2018-03-06

## 2018-03-06 NOTE — TELEPHONE ENCOUNTER
Left message for patient to call back and reschedule his appt. Dr. Davidson will be out on that day.

## 2018-03-09 ENCOUNTER — LAB VISIT (OUTPATIENT)
Dept: LAB | Facility: HOSPITAL | Age: 58
End: 2018-03-09
Attending: FAMILY MEDICINE
Payer: COMMERCIAL

## 2018-03-09 ENCOUNTER — TELEPHONE (OUTPATIENT)
Dept: FAMILY MEDICINE | Facility: CLINIC | Age: 58
End: 2018-03-09

## 2018-03-09 DIAGNOSIS — Z00.00 WELLNESS EXAMINATION: ICD-10-CM

## 2018-03-09 LAB
ANION GAP SERPL CALC-SCNC: 8 MMOL/L
BUN SERPL-MCNC: 16 MG/DL
CALCIUM SERPL-MCNC: 9.8 MG/DL
CHLORIDE SERPL-SCNC: 103 MMOL/L
CHOLEST SERPL-MCNC: 181 MG/DL
CHOLEST/HDLC SERPL: 3.8 {RATIO}
CO2 SERPL-SCNC: 30 MMOL/L
CREAT SERPL-MCNC: 1.3 MG/DL
EST. GFR  (AFRICAN AMERICAN): >60 ML/MIN/1.73 M^2
EST. GFR  (NON AFRICAN AMERICAN): >60 ML/MIN/1.73 M^2
GLUCOSE SERPL-MCNC: 91 MG/DL
HDLC SERPL-MCNC: 48 MG/DL
HDLC SERPL: 26.5 %
LDLC SERPL CALC-MCNC: 109.8 MG/DL
NONHDLC SERPL-MCNC: 133 MG/DL
POTASSIUM SERPL-SCNC: 4.3 MMOL/L
SODIUM SERPL-SCNC: 141 MMOL/L
TRIGL SERPL-MCNC: 116 MG/DL

## 2018-03-09 PROCEDURE — 80061 LIPID PANEL: CPT

## 2018-03-09 PROCEDURE — 80048 BASIC METABOLIC PNL TOTAL CA: CPT

## 2018-03-09 PROCEDURE — 36415 COLL VENOUS BLD VENIPUNCTURE: CPT | Mod: PO

## 2018-03-09 NOTE — TELEPHONE ENCOUNTER
----- Message from Juan Kumar MD sent at 3/9/2018  3:33 PM CST -----  Please inform patient that lab results are not concerning/normal. No changes to medications at this time and if there are any questions, to give us a call. Thank You,    Juan Kumar MD  Family Medicine  3/9/2018

## 2018-03-13 ENCOUNTER — TELEPHONE (OUTPATIENT)
Dept: BARIATRICS | Facility: CLINIC | Age: 58
End: 2018-03-13

## 2018-05-04 RX ORDER — IBUPROFEN 800 MG/1
TABLET ORAL
Qty: 40 TABLET | Refills: 0 | Status: SHIPPED | OUTPATIENT
Start: 2018-05-04 | End: 2018-07-26 | Stop reason: SDUPTHER

## 2018-05-07 ENCOUNTER — TELEPHONE (OUTPATIENT)
Dept: CARDIOLOGY | Facility: CLINIC | Age: 58
End: 2018-05-07

## 2018-05-07 NOTE — TELEPHONE ENCOUNTER
I called and left v/m to [facility] to [phone] if he wishes to make an appt. cm

## 2018-05-07 NOTE — TELEPHONE ENCOUNTER
----- Message from Rosa Isela Penn sent at 5/7/2018 12:48 PM CDT -----  Contact: self   Patient is having leg pain, would like to schedule a appointment. Please call back at [phone] (home) [phone] (work)

## 2018-05-21 ENCOUNTER — TELEPHONE (OUTPATIENT)
Dept: FAMILY MEDICINE | Facility: CLINIC | Age: 58
End: 2018-05-21

## 2018-05-21 NOTE — TELEPHONE ENCOUNTER
----- Message from Alberto Lucero sent at 5/21/2018  2:27 PM CDT -----  Contact: same  Type:  Test Results    Who Called:  patient  Name of Test (Lab/Mammo/Etc):  Labs & urine  Date of Test:  3/9/18  Ordering Provider:  José  Where the test was performed:  [facility]  Best Call Back Number:  [phone]  Additional Information:  n/a

## 2018-05-21 NOTE — TELEPHONE ENCOUNTER
----- Message from Beverly Gamble sent at 5/21/2018 10:23 AM CDT -----  Contact: patient  Type:  Test Results    Who Called:  Patient   Name of Test (Lab/Mammo/Etc): Fasting Lab  Date of Test:  3/9/18  Ordering Provider: Dr Juan Kumar  Where the test was performed:  [facility]  Best Call Back Number:   Additional Information:  Calling to find out the test results. Please advise.

## 2018-05-21 NOTE — TELEPHONE ENCOUNTER
----- Message from Chago Carranza sent at 5/21/2018 12:36 PM CDT -----  Contact: Dayo  Patient is returning phone call for results. Please contact patient back at [phone] (home)   thanks

## 2018-07-05 ENCOUNTER — OFFICE VISIT (OUTPATIENT)
Dept: CARDIOLOGY | Facility: CLINIC | Age: 58
End: 2018-07-05
Payer: COMMERCIAL

## 2018-07-05 VITALS
WEIGHT: 315 LBS | BODY MASS INDEX: 38.36 KG/M2 | HEIGHT: 76 IN | DIASTOLIC BLOOD PRESSURE: 80 MMHG | SYSTOLIC BLOOD PRESSURE: 120 MMHG | HEART RATE: 72 BPM

## 2018-07-05 DIAGNOSIS — R60.0 BILATERAL LEG EDEMA: ICD-10-CM

## 2018-07-05 DIAGNOSIS — I87.8 VENOUS STASIS: Primary | ICD-10-CM

## 2018-07-05 PROCEDURE — 99999 PR PBB SHADOW E&M-EST. PATIENT-LVL III: CPT | Mod: PBBFAC,,, | Performed by: NURSE PRACTITIONER

## 2018-07-05 PROCEDURE — 3008F BODY MASS INDEX DOCD: CPT | Mod: CPTII,S$GLB,, | Performed by: NURSE PRACTITIONER

## 2018-07-05 PROCEDURE — 99213 OFFICE O/P EST LOW 20 MIN: CPT | Mod: S$GLB,,, | Performed by: NURSE PRACTITIONER

## 2018-07-05 NOTE — PROGRESS NOTES
Subjective:    Patient ID:  Dayo Goodman is a 57 y.o. male who presents for follow-up of tingling in hands      HPI: Mr. Dayo Goodman presents to the clinic for follow up of venous stasis. he stated that he is doing pretty well; he is dealing with post-concussive dizziness/lightheadedness and impaired balance since a fall on July 23, 2017. He is in physical therapy twice a week at this point, and he stated that he is doing well enough to ride his bike, walk and do some weight training. He denied any SOB; he does report a pressure under left breast that tends to occur at rest after drinking coffee and eating grits. He has learned to take prilosec to avoid or eliminate symptom. He experiences this pressure less than once per week. It is not associated with SOB, N/V/diaphoresis. He does not experience the discomfort with exertion or exercise. He also reports less edema than he used to have. He denid WILSON since he has been regularly exercising.  He denied daytime somnolence; waking up coughing, choking, or gasping for breath. He does not think he has CHEO.    Medications: he is not taking any CV medications. He does take an ASA once or twice per week for primary prevention.  Sodium: he does not add salt to foods,  he is not reading labels for sodium content.   Diet: processed foods at home; drinks sweet teat that he makes himself.  Exercise: he walks every day 30 minutes-1 hour; rides bike three times/week for about 1-1.5 hours/week. He does sprints.  Tobacco: denies previous or current tobacco use   Alcohol: no alcohol use     Weight: (!) 161.4 kg (355 lb 11.4 oz) he states that his daily weights has been stable  Wt Readings from Last 3 Encounters:   07/05/18 (!) 161.4 kg (355 lb 11.4 oz)   02/27/18 (!) 163.2 kg (359 lb 12.7 oz)   02/22/18 (!) 156.5 kg (345 lb)     BP log: None.    Review of Systems   Constitution: Negative for chills, decreased appetite, fever, night sweats, weight gain and weight loss.   HENT: Negative  for congestion.    Cardiovascular: Positive for leg swelling (but improved from last year.). Negative for chest pain, claudication, cyanosis, dyspnea on exertion, irregular heartbeat, near-syncope, orthopnea, palpitations, paroxysmal nocturnal dyspnea and syncope.   Respiratory: Negative for cough, hemoptysis, shortness of breath, sputum production and wheezing.    Hematologic/Lymphatic: Negative for adenopathy and bleeding problem. Does not bruise/bleed easily.   Skin: Negative for color change and nail changes.   Gastrointestinal: Negative for bloating, abdominal pain, change in bowel habit, heartburn, hematochezia, melena, nausea and vomiting.   Genitourinary: Negative for hematuria.   Neurological: Positive for dizziness (since concussion on July 23, 2017.) and light-headedness.   Psychiatric/Behavioral: Negative for altered mental status.       Objective:   Physical Exam   Constitutional: He is oriented to person, place, and time. He appears well-developed and well-nourished. No distress.   HENT:   Head: Normocephalic and atraumatic.   Eyes: Conjunctivae are normal. No scleral icterus.   Neck: Neck supple. No JVD present. No tracheal deviation present. No thyromegaly present.   Cardiovascular: Normal rate, regular rhythm, normal heart sounds and intact distal pulses.  Exam reveals no gallop and no friction rub.    No murmur heard.  Pulmonary/Chest: Effort normal and breath sounds normal. No respiratory distress. He has no wheezes. He has no rales. He exhibits no tenderness.   Abdominal: Soft. Bowel sounds are normal. He exhibits no distension and no mass. There is no tenderness. There is no rebound and no guarding.   Abdomen obese.   Musculoskeletal: Normal range of motion. He exhibits edema (1+ edema to lower legs near ankles bilaterally. Mid to upper tibias without edema.).   Lymphadenopathy:     He has no cervical adenopathy.   Neurological: He is alert and oriented to person, place, and time.   Skin: Skin  is warm and dry. No rash noted. He is not diaphoretic. No erythema. No pallor.   Pink   Psychiatric: He has a normal mood and affect.     Results for HERMELINDA TARIQ (MRN [id number]) as of 7/5/2018 09:19   Ref. Range 3/9/2018 08:13   Sodium Latest Ref Range: 136 - 145 mmol/L 141   Potassium Latest Ref Range: 3.5 - 5.1 mmol/L 4.3   Chloride Latest Ref Range: 95 - 110 mmol/L 103   CO2 Latest Ref Range: 23 - 29 mmol/L 30 (H)   Anion Gap Latest Ref Range: 8 - 16 mmol/L 8   BUN, Bld Latest Ref Range: 6 - 20 mg/dL 16   Creatinine Latest Ref Range: 0.5 - 1.4 mg/dL 1.3   eGFR if non African American Latest Ref Range: >60 mL/min/1.73 m^2 >60.0   eGFR if African American Latest Ref Range: >60 mL/min/1.73 m^2 >60.0   Glucose Latest Ref Range: 70 - 110 mg/dL 91   Calcium Latest Ref Range: 8.7 - 10.5 mg/dL 9.8   Triglycerides Latest Ref Range: 30 - 150 mg/dL 116   Cholesterol Latest Ref Range: 120 - 199 mg/dL 181   HDL Latest Ref Range: 40 - 75 mg/dL 48   LDL Cholesterol Latest Ref Range: 63.0 - 159.0 mg/dL 109.8   Total Cholesterol/HDL Ratio Latest Ref Range: 2.0 - 5.0  3.8   Results for HERMELINDA TARIQ (MRN [id number]) as of 7/5/2018 09:19   Ref. Range 3/9/2018 08:13   Non-HDL Cholesterol Latest Units: mg/dL 133        Assessment:      1. Venous stasis    2. BMI 40.0-44.9, adult    3. Bilateral leg edema        Plan:     Venous stasis    BMI 40.0-44.9, adult    Bilateral leg edema       Discussed sodium restriction and rationale. He agreed to read labels for sodium content. Handout given.  Increase produce; reduce processed foods. Discussed rationale. Discussed sugar and sodium content of typical processed foods and its relationship to  Weight gain; inability to lose weight; edema; and metabolic and CV consequences. He verbalized his understanding of the information.  Continue exercising. Resistance training and aerobic exercise.  Elevate legs when seated.  He is aware that he needs to improve his weight and he is making changes to  accomplish this.  Follow up with Dr. Piedra in one year or call sooner for any problems.

## 2018-07-10 ENCOUNTER — TELEPHONE (OUTPATIENT)
Dept: ORTHOPEDICS | Facility: CLINIC | Age: 58
End: 2018-07-10

## 2018-07-10 NOTE — TELEPHONE ENCOUNTER
----- Message from Jimena Vick sent at 7/10/2018  1:38 PM CDT -----  Contact: Patient  Type:  RX Refill Request    Who Called:  Patient  Refill or New Rx:  Refill  RX Name and Strength:    ibuprofen (ADVIL,MOTRIN) 800 MG tablet  How is the patient currently taking it? (ex. 1XDay):    Is this a 30 day or 90 day RX:    Preferred Pharmacy with phone number:    [facility] [id number] [facility] [place]  [facility] [address] [place] [facility] [place]  [address]  [place] [address]  Phone: [phone] Fax: [phone]  Local or Mail Order:  Local  Ordering Provider:  Sam Lang Call Back Number:    Additional Information:

## 2018-07-10 NOTE — TELEPHONE ENCOUNTER
Called pt and advised unfortunately Dr. Vargas is out of town until 7/12/18. Advised Rx refills will have to wait until he returns. Pt verbalized undertsanding.

## 2018-07-26 RX ORDER — IBUPROFEN 800 MG/1
TABLET ORAL
Qty: 40 TABLET | Refills: 0 | Status: SHIPPED | OUTPATIENT
Start: 2018-07-26 | End: 2018-11-29 | Stop reason: SDUPTHER

## 2018-07-26 NOTE — TELEPHONE ENCOUNTER
----- Message from Greg Bray sent at 7/26/2018  1:47 PM CDT -----  Contact: patient  Type:  RX Refill Request    Who Called:  Patient  Refill or New Rx:  refill  RX Name and Strength:  ibuprofen (ADVIL,MOTRIN) 800 MG tablet  How is the patient currently taking it? (ex. 1XDay):    Is this a 30 day or 90 day RX:    Preferred Pharmacy with phone number:  [facility]  Local or Mail Order:  local  Ordering Provider:  Dr.Guevara Lang Call Back Number:  [phone]  Additional Information:  Requesting a call back when script has been sent

## 2018-09-13 ENCOUNTER — DOCUMENTATION ONLY (OUTPATIENT)
Dept: FAMILY MEDICINE | Facility: CLINIC | Age: 58
End: 2018-09-13

## 2018-09-13 NOTE — PROGRESS NOTES
Pre-Visit Chart Review  For Appointment Scheduled on 9/14/18    Health Maintenance Due   Topic Date Due    Colonoscopy  08/26/2009    Influenza Vaccine  08/01/2018

## 2018-09-14 ENCOUNTER — OFFICE VISIT (OUTPATIENT)
Dept: FAMILY MEDICINE | Facility: CLINIC | Age: 58
End: 2018-09-14
Payer: COMMERCIAL

## 2018-09-14 VITALS
BODY MASS INDEX: 38.36 KG/M2 | WEIGHT: 315 LBS | DIASTOLIC BLOOD PRESSURE: 78 MMHG | HEIGHT: 76 IN | SYSTOLIC BLOOD PRESSURE: 115 MMHG | HEART RATE: 71 BPM | TEMPERATURE: 98 F

## 2018-09-14 DIAGNOSIS — J32.1 CHRONIC FRONTAL SINUSITIS: ICD-10-CM

## 2018-09-14 DIAGNOSIS — R42 DIZZINESS: ICD-10-CM

## 2018-09-14 DIAGNOSIS — M17.0 PRIMARY OSTEOARTHRITIS OF BOTH KNEES: ICD-10-CM

## 2018-09-14 PROBLEM — J32.9 CHRONIC SINUSITIS: Status: ACTIVE | Noted: 2018-09-14

## 2018-09-14 PROCEDURE — 99213 OFFICE O/P EST LOW 20 MIN: CPT | Mod: S$GLB,,, | Performed by: FAMILY MEDICINE

## 2018-09-14 PROCEDURE — 99999 PR PBB SHADOW E&M-EST. PATIENT-LVL III: CPT | Mod: PBBFAC,,, | Performed by: FAMILY MEDICINE

## 2018-09-14 PROCEDURE — 3008F BODY MASS INDEX DOCD: CPT | Mod: CPTII,S$GLB,, | Performed by: FAMILY MEDICINE

## 2018-09-14 RX ORDER — CETIRIZINE HYDROCHLORIDE 10 MG/1
10 TABLET ORAL DAILY
Refills: 0
Start: 2018-09-14 | End: 2019-01-29 | Stop reason: SDUPTHER

## 2018-09-14 RX ORDER — FLUTICASONE PROPIONATE 50 MCG
1 SPRAY, SUSPENSION (ML) NASAL DAILY
Qty: 1 BOTTLE | Refills: 11 | Status: SHIPPED | OUTPATIENT
Start: 2018-09-14 | End: 2019-01-14 | Stop reason: SDUPTHER

## 2018-09-14 RX ORDER — ASPIRIN 81 MG/1
81 TABLET ORAL DAILY
COMMUNITY

## 2018-09-14 NOTE — PATIENT INSTRUCTIONS
4 Steps for Eating Healthier  Changing the way you eat can improve your health. It can lower your cholesterol and blood pressure, and help you stay at a healthy weight. Your diet doesnt have to be bland and boring to be healthy. Just watch your calories and follow these steps:    1. Eat fewer unhealthy fats  · Choose more fish and lean meats instead of fatty cuts of meat.  · Skip butter and lard, and use less margarine.  · Pass on foods that have palm, coconut, or hydrogenated oils.  · Eat fewer high-fat dairy foods like cheese, ice cream, and whole milk.  · Get a heart-healthy cookbook and try some low-fat recipes.  2. Go light on salt  · Keep the saltshaker off the table.  · Limit high-salt ingredients, such as soy sauce, bouillon, and garlic salt.  · Instead of adding salt when cooking, season your food with herbs and flavorings. Try lemon, garlic, and onion.  · Limit convenience foods, such as boxed or canned foods and restaurant food.  · Read food labels and choose lower-sodium options.  3. Limit sugar  · Pause before you add sugars to pancakes, cereal, coffee, or tea. This includes white and brown table sugar, syrup, honey, and molasses. Cut your usual amount by half.  · Use non-sugar sweeteners. Stevia, aspartame, and sucralose can satisfy a sweet tooth without adding calories.  · Swap out sugar-filled soda and other drinks. Buy sugar-free or low-calorie beverages. Remember water is always the best choice.  · Read labels and choose foods with less added sugar. Keep in mind that dairy foods and foods with fruit will have some natural sugar.  · Cut the sugar in recipes by 1/3 to 1/2. Boost the flavor with extracts like almond, vanilla, or orange. Or add spices such as cinnamon or nutmeg.  4. Eat more fiber  · Eat fresh fruits and vegetables every day.  · Boost your diet with whole grains. Go for oats, whole-grain rice, and bran.  · Add beans and lentils to your meals.  · Drink more water to match your fiber  increase. This is to help prevent constipation.  Date Last Reviewed: 5/11/2015  © 8400-5551 [employer]. [address] [place] [address], [place] [address]. All rights reserved. This information is not intended as a substitute for professional medical care. Always follow your healthcare professional's instructions.        Walking for Fitness  Fitness walking has something for everyone, even people who are already fit. Walking is one of the safest ways to condition your body aerobically. It can boost energy, help you lose weight, and reduce stress.    Physical benefits  · Walking strengthens your heart and lungs, and tones your muscles.  · When walking, your feet land with less impact than in other sports. This reduces chances of muscle, bone, and joint injury.  · Regular walking improves your cholesterol levels and lowers your risk of heart disease. And it helps you control your blood sugar if you have diabetes.  · Walking is a weight-bearing activity, which helps maintain bone density. This can help prevent osteoporosis.  Personal rewards  · Taking walks can help you relax and manage stress. And fitness walking may make you feel better about yourself.  · Walking can help you sleep better at night and make you less likely to be depressed.  · Regular walking may help maintain your memory as you get older.  · Walking is a great way to spend extra time with friends and family members. Be sure to invite your dog along!  Q&A about fitness walking  Q: Will walking keep me fit?  A: Yes. Regular walking at the right pace gives you all the benefits of other aerobic activities, such as jogging and swimming.  Q: Will walking help me lose weight and keep it off?  A: Yes. Per mile, walking can burn as many calories as jogging. Your health care provider can help work walking into your weight-loss plan.  Q: Is walking safe for my health?  A: Yes. Walking is safe if you have high blood pressure, diabetes, heart disease, or  other conditions. Talk to your healthcare provider before you start.  Date Last Reviewed: 4/1/2017  © 9722-0552 [employer]. [address] [place] [address], [place] [address]. All rights reserved. This information is not intended as a substitute for professional medical care. Always follow your healthcare professional's instructions.        Liraglutide injection (Weight Management)  What is this medicine?  LIRAGLUTIDE (LIR a GLOO tide) is used with a reduced calorie diet and exercise to help you lose weight.  How should I use this medicine?  This medicine is for injection under the skin of your upper leg, stomach area, or upper arm. You will be taught how to prepare and give this medicine. Use exactly as directed. Take your medicine at regular intervals. Do not take it more often than directed.   It is important that you put your used needles and syringes in a special sharps container. Do not put them in a trash can. If you do not have a sharps container, call your pharmacist or healthcare provider to get one.   A special MedGuide will be given to you by the pharmacist with each prescription and refill. Be sure to read this information carefully each time.   Talk to your pediatrician regarding the use of this medicine in children. Special care may be needed.  What side effects may I notice from receiving this medicine?  Side effects that you should report to your doctor or health care professional as soon as possible:  · allergic reactions like skin rash, itching or hives, swelling of the face, lips, or tongue  · breathing problems  · fever, chills  · loss of appetite  · signs and symptoms of low blood sugar such as feeling anxious, confusion, dizziness, increased hunger, unusually weak or tired, sweating, shakiness, cold, irritable, headache, blurred vision, fast heartbeat, loss of consciousness  · trouble passing urine or change in the amount of urine  · unusual stomach pain or upset  · vomiting  Side  effects that usually do not require medical attention (Report these to your doctor or health care professional if they continue or are bothersome.):  · constipation  · diarrhea  · fatigue  · headache  · nausea  What may interact with this medicine?  · acetaminophen  · atorvastatin  · birth control pills  · digoxin  · griseofulvin  · lisinopril  What if I miss a dose?  If you miss a dose, take it as soon as you can. If it is almost time for your next dose, take only that dose. Do not take double or extra doses. If you miss your dose for 3 days or more, call your doctor or health care professional to talk about how to restart this medicine.  Where should I keep my medicine?  Keep out of the reach of children.  Store unopened pen in a refrigerator between 2 and 8 degrees C (36 and 46 degrees F). Do not freeze or use if the medicine has been frozen. Protect from light and excessive heat. After you first use the pen, it can be stored at room temperature between 15 and 30 degrees C (59 and 86 degrees F) or in a refrigerator. Throw away your used pen after 30 days or after the expiration date, whichever comes first.  Do not store your pen with the needle attached. If the needle is left on, medicine may leak from the pen.  What should I tell my health care provider before I take this medicine?  They need to know if you have any of these conditions:  · endocrine tumors (MEN 2) or if someone in your family had these tumors  · gallstones  · high cholesterol  · history of alcohol abuse problem  · history of pancreatitis  · kidney disease or if you are on dialysis  · liver disease  · previous swelling of the tongue, face, or lips with difficulty breathing, difficulty swallowing, hoarseness, or tightening of the throat  · stomach problems  · suicidal thoughts, plans, or attempt; a previous suicide attempt by you or a family member  · thyroid cancer or if someone in your family had thyroid cancer  · an unusual or allergic reaction  to liraglutide, medicines, foods, dyes, or preservatives  · pregnant or trying to get pregnant  · breast-feeding  What should I watch for while using this medicine?  Visit your doctor or health care professional for regular checks on your progress. This medicine is intended to be used in addition to a healthy diet and appropriate exercise. The best results are achieved this way. Do not increase or in any way change your dose without consulting your doctor or health care professional.  This medicine may affect blood sugar levels. If you have diabetes, check with your doctor or health care professional before you change your diet or the dose of your diabetic medicine.  Patients and their families should watch out for worsening depression or thoughts of suicide. Also watch out for sudden changes in feelings such as feeling anxious, agitated, panicky, irritable, hostile, aggressive, impulsive, severely restless, overly excited and hyperactive, or not being able to sleep. If this happens, especially at the beginning of treatment or after a change in dose, call your health care professional.  NOTE:This sheet is a summary. It may not cover all possible information. If you have questions about this medicine, talk to your doctor, pharmacist, or health care provider. Copyright© 2017 [employer]

## 2018-09-14 NOTE — PROGRESS NOTES
Subjective:   Patient ID: Dayo Goodman is a 58 y.o. male     Chief Complaint:Establish Care      Pt here to establish care. Has severe obesity and poorly controlled. Pt exercising but not dieting. Pt also with osteoarthritis of bilateral knees and stable. the patient. Pt also with chronic sinusitis, pt has tried intermittent flonase/zyrtec with limited improvement. Pt also notes dizziness that occurs with ambulation. Has been going on for years. Nothing makes it better. Is limiting his ability to work. Has been seen by ENT and cardiologist who unsure etiology. Has never seen neurologist.      Review of Systems   Constitutional: Negative for chills and fever.   HENT: Negative for sore throat.    Eyes: Negative for visual disturbance.   Respiratory: Negative for shortness of breath.    Cardiovascular: Negative for chest pain.   Gastrointestinal: Negative for abdominal pain.   Endocrine: Negative for polyuria.   Genitourinary: Negative for dysuria.   Musculoskeletal: Negative for back pain.   Skin: Negative for color change.   Neurological: Positive for dizziness. Negative for headaches.   Psychiatric/Behavioral: Negative for agitation and confusion.     Past Medical History:   Diagnosis Date    Arthritis     Cyst, Baker's knee     L    GERD (gastroesophageal reflux disease)     Kidney stones     PVD (peripheral vascular disease)      Objective:     Vitals:    09/14/18 0918   BP: 115/78   Pulse: 71   Temp: 98.2 °F (36.8 °C)     Body mass index is 43.55 kg/m².  Physical Exam   Constitutional: He is oriented to person, place, and time. He appears well-developed and well-nourished.   HENT:   Head: Normocephalic and atraumatic.   Eyes: EOM are normal. Pupils are equal, round, and reactive to light.   Neck: Normal range of motion. Neck supple.   Cardiovascular: Normal rate, regular rhythm, normal heart sounds and intact distal pulses.   Pulmonary/Chest: Effort normal and breath sounds normal.   Abdominal: Soft. He  exhibits no distension.   Musculoskeletal: Normal range of motion.   Neurological: He is alert and oriented to person, place, and time.   Skin: Skin is warm and dry.   Psychiatric: He has a normal mood and affect. His behavior is normal. Judgment and thought content normal.   Nursing note and vitals reviewed.    Assessment:     1. BMI 40.0-44.9, adult    2. Primary osteoarthritis of both knees    3. Dizziness    4. Chronic frontal sinusitis      Plan:   BMI 40.0-44.9, adult  -     CBC auto differential; Future; Expected date: 03/14/2019  -     Comprehensive metabolic panel; Future; Expected date: 03/14/2019  -     Hemoglobin A1c; Future; Expected date: 03/14/2019  -     Lipid panel; Future; Expected date: 03/14/2019  -     TSH; Future; Expected date: 03/14/2019  - counseled patient on importance of diet and exercise, pt interested in medication to help reduce weight but would like to keep that as last resort, gave him info on saxenda prior to leaving    Primary osteoarthritis of both knees  - xray from 6/17 with bilateral tricompartmental disease, currently controlled conservatively with knee brace    Dizziness  -     Ambulatory referral to Neurology  - pt has been evaluated by cardiology and ENT with no improvement, is limiting his ability to return to work, will refer to neuo    Chronic frontal sinusitis  -     fluticasone (FLONASE) 50 mcg/actuation nasal spray; 1 spray (50 mcg total) by Each Nare route once daily.  Dispense: 1 Bottle; Refill: 11  -     cetirizine (ZYRTEC) 10 MG tablet; Take 1 tablet (10 mg total) by mouth once daily.; Refill: 0  - pt has tried meds without improvement but has not been compliant with every day dosing.        Discharge:   Patient readiness: acceptance and barriers:none    During the course of the visit the patient was educated and counseled about the following:     Obesity:   General weight loss/lifestyle modification strategies discussed (elicit support from others; identify  saboteurs; non-food rewards, etc).    Goals: Obesity: Reduce calorie intake and BMI    Did patient meet goals/outcomes: No    The following self management tools provided: Diet and Medication information    Patient Instructions (the written plan) was given to the patient/family.     Time spent with patient: 30 minutes and over half of that time was spent on counseling an coordination of care.    Barriers to medications present (no )    Adverse reactions to current medications (no)    Over the counter medications reviewed (Yes)      Rafa Walters MD  09/14/2018

## 2018-09-19 ENCOUNTER — TELEPHONE (OUTPATIENT)
Dept: FAMILY MEDICINE | Facility: CLINIC | Age: 58
End: 2018-09-19

## 2018-09-19 NOTE — TELEPHONE ENCOUNTER
Rafa Walters MD   You 2 hours ago (12:29 PM)     There is nothing this supplement reports having in its ingredients that should react with his current medications poorly. The censor body toner is not approved by the medical community for any reason including weight loss so I can not recommend taking it. (Routing comment)      Advised patient of message from provider. Patient verbalized understanding.

## 2018-09-19 NOTE — TELEPHONE ENCOUNTER
Patient wants to know if it is ok to take this supplement with his rx's. Printed supplement facts sheet and placed in your inbasket. Please advise.

## 2018-09-19 NOTE — TELEPHONE ENCOUNTER
----- Message from Renetta Alexis LPN sent at 9/18/2018 11:49 PM CDT -----  Contact: self      ----- Message -----  From: Maddison Green  Sent: 9/18/2018   1:34 PM  To: Romeo Craig Staff    Patient is calling back regarding the vitamin Tonalin( Consor) body toner he is taking. Patient needs to know if it will interact with his prescription medications. please call patient at [phone]. Thanks!

## 2018-11-29 RX ORDER — IBUPROFEN 800 MG/1
TABLET ORAL
Qty: 40 TABLET | Refills: 0 | Status: SHIPPED | OUTPATIENT
Start: 2018-11-29 | End: 2019-04-25 | Stop reason: SDUPTHER

## 2018-11-29 NOTE — TELEPHONE ENCOUNTER
----- Message from Roger Pineda sent at 11/29/2018  3:01 PM CST -----  Contact: Patient  Type:  RX Refill Request    Who Called:  Patient  Refill or New Rx:  Refill  RX Name and Strength:  ibuprofen (ADVIL,MOTRIN) 800 MG tablet  How is the patient currently taking it? (ex. 1XDay):  TAKE 1 TABLET(800 MG) BY MOUTH THREE TIMES DAILY  Is this a 30 day or 90 day RX:  90  Preferred Pharmacy with phone number:   [id number] [address] [facility] [address] [facility] [address] [place]  [address]  [place] [address]  Phone: [phone] Fax: [phone]  Local or Mail Order:  Local  Ordering Provider:  Dr. Hiram Vargas  Best Call Back Number:  [phone]  Additional Information:  Patient is requesting a call back. Thanks!

## 2018-12-20 ENCOUNTER — TELEPHONE (OUTPATIENT)
Dept: FAMILY MEDICINE | Facility: CLINIC | Age: 58
End: 2018-12-20

## 2018-12-20 NOTE — TELEPHONE ENCOUNTER
----- Message from Katiuska Fine sent at 12/20/2018  3:43 PM CST -----  Call pt at [phone]  Asking for refill for fluticasone (FLONASE) 50 mcg/actuation nasal spray   [facility] [address] [facility] [address] [facility] [address] [place]  [address]  [place] [address]  Phone: [phone] Fax: [phone]

## 2018-12-20 NOTE — TELEPHONE ENCOUNTER
Advised pt that he still has 11 refills left and to please contact pharmacy. Pt verbalized understanding.

## 2019-01-14 ENCOUNTER — TELEPHONE (OUTPATIENT)
Dept: FAMILY MEDICINE | Facility: CLINIC | Age: 59
End: 2019-01-14

## 2019-01-14 DIAGNOSIS — K21.9 GASTROESOPHAGEAL REFLUX DISEASE WITHOUT ESOPHAGITIS: ICD-10-CM

## 2019-01-14 DIAGNOSIS — J32.1 CHRONIC FRONTAL SINUSITIS: ICD-10-CM

## 2019-01-14 RX ORDER — FLUTICASONE PROPIONATE 50 MCG
1 SPRAY, SUSPENSION (ML) NASAL DAILY
Qty: 1 BOTTLE | Refills: 11 | Status: SHIPPED | OUTPATIENT
Start: 2019-01-14 | End: 2019-01-29 | Stop reason: SDUPTHER

## 2019-01-14 RX ORDER — OMEPRAZOLE 40 MG/1
40 CAPSULE, DELAYED RELEASE ORAL DAILY
Qty: 90 CAPSULE | Refills: 1 | Status: SHIPPED | OUTPATIENT
Start: 2019-01-14 | End: 2020-03-16

## 2019-01-14 NOTE — TELEPHONE ENCOUNTER
Medication refill were sent to provider, he ask for new rxs because his insurance company change his pharmacy

## 2019-01-14 NOTE — TELEPHONE ENCOUNTER
----- Message from Evan Zhang sent at 1/14/2019  1:04 PM CST -----  Contact: pt  Type:  RX Refill Request    Who Called:  pt  Refill or New Rx:  refill  RX Name and Strength:  fluticasone (FLONASE) 50 mcg/actuation nasal spray and omeprazole (PRILOSEC) 40 MG capsule  How is the patient currently taking it? (ex. 1XDay):  2 x / 2 x day  Is this a 30 day or 90 day RX:  90  Preferred Pharmacy with phone number:   [facility]  Phone: [phone]    Local or Mail Order:    Ordering Provider:    Best Call Back Number:  [phone]  Additional Information:  Pt has a cold with post nasal drip and is requesting something to be called in. Please call pt to discuss if an appointment is needed

## 2019-01-29 ENCOUNTER — TELEPHONE (OUTPATIENT)
Dept: FAMILY MEDICINE | Facility: CLINIC | Age: 59
End: 2019-01-29

## 2019-01-29 ENCOUNTER — OFFICE VISIT (OUTPATIENT)
Dept: FAMILY MEDICINE | Facility: CLINIC | Age: 59
End: 2019-01-29
Payer: COMMERCIAL

## 2019-01-29 VITALS
HEART RATE: 67 BPM | BODY MASS INDEX: 38.36 KG/M2 | HEIGHT: 76 IN | SYSTOLIC BLOOD PRESSURE: 129 MMHG | TEMPERATURE: 99 F | DIASTOLIC BLOOD PRESSURE: 74 MMHG | WEIGHT: 315 LBS

## 2019-01-29 DIAGNOSIS — R42 DIZZINESS: ICD-10-CM

## 2019-01-29 DIAGNOSIS — J30.89 SEASONAL ALLERGIC RHINITIS DUE TO OTHER ALLERGIC TRIGGER: ICD-10-CM

## 2019-01-29 DIAGNOSIS — F41.9 ANXIETY: ICD-10-CM

## 2019-01-29 DIAGNOSIS — E66.01 MORBID OBESITY WITH BMI OF 40.0-44.9, ADULT: ICD-10-CM

## 2019-01-29 DIAGNOSIS — J06.9 UPPER RESPIRATORY TRACT INFECTION, UNSPECIFIED TYPE: Primary | ICD-10-CM

## 2019-01-29 PROCEDURE — 3008F BODY MASS INDEX DOCD: CPT | Mod: CPTII,S$GLB,, | Performed by: NURSE PRACTITIONER

## 2019-01-29 PROCEDURE — 99213 OFFICE O/P EST LOW 20 MIN: CPT | Mod: S$GLB,,, | Performed by: NURSE PRACTITIONER

## 2019-01-29 PROCEDURE — 3008F PR BODY MASS INDEX (BMI) DOCUMENTED: ICD-10-PCS | Mod: CPTII,S$GLB,, | Performed by: NURSE PRACTITIONER

## 2019-01-29 PROCEDURE — 99213 PR OFFICE/OUTPT VISIT, EST, LEVL III, 20-29 MIN: ICD-10-PCS | Mod: S$GLB,,, | Performed by: NURSE PRACTITIONER

## 2019-01-29 PROCEDURE — 99999 PR PBB SHADOW E&M-EST. PATIENT-LVL III: ICD-10-PCS | Mod: PBBFAC,,, | Performed by: NURSE PRACTITIONER

## 2019-01-29 PROCEDURE — 99999 PR PBB SHADOW E&M-EST. PATIENT-LVL III: CPT | Mod: PBBFAC,,, | Performed by: NURSE PRACTITIONER

## 2019-01-29 RX ORDER — CETIRIZINE HYDROCHLORIDE 10 MG/1
10 TABLET ORAL DAILY
Refills: 0
Start: 2019-01-29 | End: 2019-09-27

## 2019-01-29 RX ORDER — FLUTICASONE PROPIONATE 50 MCG
1 SPRAY, SUSPENSION (ML) NASAL DAILY
Qty: 1 BOTTLE | Refills: 11 | Status: SHIPPED | OUTPATIENT
Start: 2019-01-29 | End: 2020-03-16 | Stop reason: SDUPTHER

## 2019-01-29 NOTE — PATIENT INSTRUCTIONS
I counseled the patient on general home care guidelines for cough and congestion including increasing fluid intake, getting plenty of rest and use of OTC cough and cold medications.  I recommended guafenesin for congestion and dextromethorphan as directed for cough.  A brand like Mucinex DM is recommended.  Avoidance of decongestants is recommended for patients with heart problems and hypertension.  Extra vitamin C may also benefit.  Return to clinic if symptoms last longer than 10 days or sooner if worsen with symptoms like fever > 100.4, severe sinus pain or headache, thick yellow nasal discharge or sputum, dehydration or lethargy.          Allergic Rhinitis  Allergic rhinitis is an allergic reaction that affects the nose, and often the eyes. Its often known as nasal allergies. Nasal allergies are often due to things in the environment that are breathed in. Depending what you are sensitive to, nasal allergies may occur only during certain seasons. Or they may occur year round. Common indoor allergens include house dust mites, mold, cockroaches, and pet dander. Outdoor allergens include pollen from trees, grasses, and weeds.   Symptoms include a drippy, stuffy, and itchy nose. They also include sneezing and red and itchy eyes. You may feel tired more often. Severe allergies may also affect your breathing and trigger a condition called asthma.   Tests can be done to see what allergens are affecting you. You may be referred to an allergy specialist for testing and further evaluation.  Home care  Your healthcare provider may prescribe medicines to help relieve allergy symptoms. These may include oral medicines, nasal sprays, or eye drops.  Ask your provider for advice on how to avoid substances that you are allergic to. Below are a few tips for each type of allergen.  Pet dander:  · Do not have pets with fur and feathers.  · If you can't avoid having a pet, keep it out of your bedroom and off upholstered  furniture.  Pollen:  · When pollen counts are high, keep windows of your car and home closed. If possible, use an air conditioner instead.  · Wear a filter mask when mowing or doing yard work.  House dust mites:  · Wash bedding every week in warm water and detergent and dry on a hot setting.  · Cover the mattress, box spring, and pillows with allergy covers.   · If possible, sleep in a room with no carpet, curtains, or upholstered furniture.  Cockroaches:  · Store food in sealed containers.  · Remove garbage from the home promptly.  · Fix water leaks  Mold:  · Keep humidity low by using a dehumidifier or air conditioner. Keep the dehumidifier and air conditioner clean and free of mold.  · Clean moldy areas with bleach and water.  In general:  · Vacuum once or twice a week. If possible, use a vacuum with a high-efficiency particulate air (HEPA) filter.  · Do not smoke. Avoid cigarette smoke. Cigarette smoke is an irritant that can make symptoms worse.  Follow-up care  Follow up as advised by the healthcare provider or our staff. If you were referred to an allergy specialist, make this appointment promptly.  When to seek medical advice  Call your healthcare provider right away if the following occur:  · Coughing or wheezing  · Fever greater than 100.4°F (38°C)  · Hives (raised red bumps)  · Continuing symptoms, new symptoms, or worsening symptoms  Call 911 right away if you have:  · Trouble breathing  · Severe swelling of the face or severe itching of the eyes or mouth  Date Last Reviewed: 3/1/2017  © 3271-8797 [employer] [address] [place] [address]. All rights reserved. This information is not intended as a substitute for professional medical care. Always follow your healthcare professional's instructions.        Viral Upper Respiratory Illness (Adult)  You have a viral upper respiratory illness (URI), which is another term for the common cold. This illness is contagious during the first  few days. It is spread through the air by coughing and sneezing. It may also be spread by direct contact (touching the sick person and then touching your own eyes, nose, or mouth). Frequent handwashing will decrease risk of spread. Most viral illnesses go away within 7 to 10 days with rest and simple home remedies. Sometimes the illness may last for several weeks. Antibiotics will not kill a virus, and they are generally not prescribed for this condition.    Home care  · If symptoms are severe, rest at home for the first 2 to 3 days. When you resume activity, don't let yourself get too tired.  · Avoid being exposed to cigarette smoke (yours or others).  · You may use acetaminophen or ibuprofen to control pain and fever, unless another medicine was prescribed. (Note: If you have chronic liver or kidney disease, have ever had a stomach ulcer or gastrointestinal bleeding, or are taking blood-thinning medicines, talk with your healthcare provider before using these medicines.) Aspirin should never be given to anyone under 18 years of age who is ill with a viral infection or fever. It may cause severe liver or brain damage.  · Your appetite may be poor, so a light diet is fine. Avoid dehydration by drinking 6 to 8 glasses of fluids per day (water, soft drinks, juices, tea, or soup). Extra fluids will help loosen secretions in the nose and lungs.  · Over-the-counter cold medicines will not shorten the length of time youre sick, but they may be helpful for the following symptoms: cough, sore throat, and nasal and sinus congestion. (Note: Do not use decongestants if you have high blood pressure.)  Follow-up care  Follow up with your healthcare provider, or as advised.  When to seek medical advice  Call your healthcare provider right away if any of these occur:  · Cough with lots of colored sputum (mucus)  · Severe headache; face, neck, or ear pain  · Difficulty swallowing due to throat pain  · Fever of 100.4°F  (38°C)  Call 911, or get immediate medical care  Call emergency services right away if any of these occur:  · Chest pain, shortness of breath, wheezing, or difficulty breathing  · Coughing up blood  · Inability to swallow due to throat pain  Date Last Reviewed: 9/13/2015  © 5139-6761 "[employer]. [address] [place] [address], [place] [address]. All rights reserved. This information is not intended as a substitute for professional medical care. Always follow your healthcare professional's instructions.        Anxiety Reaction  Anxiety is the feeling we all get when we think something bad might happen. It is a normal response to stress and usually causes only a mild reaction. When anxiety becomes more severe, it can interfere with daily life. In some cases, you may not even be aware of what it is youre anxious about. There may also be a genetic link or it may be a learned behavior in the home.  Both psychological and physical triggers cause stress reaction. It's often a response to fear or emotional stress, real or imagined. This stress may come from home, family, work, or social relationships.  During an anxiety reaction, you may feel:  · Helpless  · Nervous  · Depressed  · Irritable  Your body may show signs of anxiety in many ways. You may experience:  · Dry mouth  · Shakiness  · Dizziness  · Weakness  · Trouble breathing  · Breathing fast (hyperventilating)  · Chest pressure  · Sweating  · Headache  · Nausea  · Diarrhea  · Tiredness  · Inability to sleep  · Sexual problems  Home care  · Try to locate the sources of stress in your life. They may not be obvious. These may include:  ¨ Daily hassles of life (traffic jams, missed appointments, car troubles, etc.)  ¨ Major life changes, both good (new baby, job promotion) and bad (loss of job, loss of loved one)  ¨ Overload: feeling that you have too many responsibilities and can't take care of all of them at once  ¨ Feeling helpless, feeling that your problems  are beyond what youre able to solve  · Notice how your body reacts to stress. Learn to listen to your body signals. This will help you take action before the stress becomes severe.  · When you can, do something about the source of your stress. (Avoid hassles, limit the amount of change that happens in your life at one time and take a break when you feel overloaded).  · Unfortunately, many stressful situations can't be avoided. It is necessary to learn how to better manage stress. There are many proven methods that will reduce your anxiety. These include simple things like exercise, good nutrition and adequate rest. Also, there are certain techniques that are helpful:  ¨ Relaxation  ¨ Breathing exercises  ¨ Visualization  ¨ Biofeedback  ¨ Meditation  For more information about this, consult your doctor or go to a local bookstore and review the many books and tapes available on this subject.  Follow-up care  If you feel that your anxiety is not responding to self-help measures, contact your doctor or make an appointment with a counselor. You may need short-term psychological counseling and temporary medicine to help you manage stress.  Call 911  Call your healthcare provider right away if any of these occur:  · Trouble breathing  · Confusion  · Drowsiness or trouble wakening  · Fainting or loss of consciousness  · Rapid heart rate  · Seizure  · New chest pain that becomes more severe, lasts longer, or spreads into your shoulder, arm, neck, jaw, or back  When to seek medical advice  Call your healthcare provider right away if any of these occur:  · Your symptoms get worse  · Severe headache not relieved by rest and mild pain reliever  Date Last Reviewed: 9/29/2015  © 5486-2063 [employer] [address] [place] [address], [place] [address]. All rights reserved. This information is not intended as a substitute for professional medical care. Always follow your healthcare professional's instructions.

## 2019-01-29 NOTE — PROGRESS NOTES
Subjective:       Patient ID: Dayo Goodman is a 58 y.o. male.    Chief Complaint: Cough (congestion )    Mr. Goodman presents today for URI and allergy symptoms. Began Saturday. He has a cough with white occasional sputum production. Has not tried any OTC methods to improve symptoms. Denies fever, sinus pain/pressure. Ears are itching.     He has been out of work on workman's comp for about a year and a half after experiencing a concussion. He is seen by an ENT and a neuropsychiatrist because he still experiences intermittent dizziness. Considered psychosomatic at this point. Takes meclizine PRN. He is considering asking his PCP for a second opinion because he would like to go back to work. Also experiences anxiety- had SOB this morning. Resolved on its own. He believes anxiety was the cause. Does not occur frequently.       URI    This is a new problem. The current episode started in the past 7 days. The problem has been waxing and waning. There has been no fever. Associated symptoms include congestion, coughing, ear pain, rhinorrhea and a sore throat (mild). Pertinent negatives include no abdominal pain, chest pain, diarrhea, headaches, nausea, sinus pain, vomiting or wheezing. He has tried nothing for the symptoms.     Patient Active Problem List   Diagnosis    Neuropathy, cervical    Osteoarthritis of both knees    Anxiety    Hematuria    Venous stasis dermatitis    Bilateral leg edema    Anemia    Chronic abdominal pain    BMI 40.0-44.9, adult    Diverticulosis    Gastroesophageal reflux disease    Post-nasal drip    Nephrolithiasis    Dizziness    Chronic sinusitis       Review of Systems   Constitutional: Negative for activity change, appetite change, chills, fatigue and fever.   HENT: Positive for congestion, ear pain, rhinorrhea and sore throat (mild). Negative for sinus pressure and sinus pain.         Ear itching     Eyes: Positive for itching.   Respiratory: Positive for cough. Negative for  shortness of breath and wheezing.    Cardiovascular: Negative for chest pain and palpitations.   Gastrointestinal: Negative for abdominal pain, diarrhea, nausea and vomiting.   Musculoskeletal: Negative for myalgias.   Neurological: Negative for headaches.       Objective:      Physical Exam   Constitutional: He is oriented to person, place, and time. Vital signs are normal.   Eyes: Pupils are equal, round, and reactive to light.   Neck: Neck supple.   Cardiovascular: Normal rate, regular rhythm and normal heart sounds.   No murmur heard.  Pulmonary/Chest: Effort normal and breath sounds normal. No respiratory distress. He has no wheezes. He has no rales.   Lymphadenopathy:     He has no cervical adenopathy.   Neurological: He is alert and oriented to person, place, and time.   Skin: Skin is warm and dry.       Assessment:       1. Upper respiratory tract infection, unspecified type    2. Seasonal allergic rhinitis due to other allergic trigger    3. Morbid obesity with BMI of 40.0-44.9, adult        Plan:       Dayo was seen today for cough.    Diagnoses and all orders for this visit:    Upper respiratory tract infection, unspecified type  I counseled the patient on general home care guidelines for cough and congestion including increasing fluid intake, getting plenty of rest and use of OTC cough and cold medications.  I recommended guafenesin for congestion and dextromethorphan as directed for cough.  A brand like Mucinex DM is recommended.  Avoidance of decongestants is recommended for patients with heart problems and hypertension.  Extra vitamin C may also benefit.  Return to clinic if symptoms last longer than 10 days or sooner if worsen with symptoms like fever > 100.4, severe sinus pain or headache, thick yellow nasal discharge or sputum, dehydration or lethargy.      Seasonal allergic rhinitis due to other allergic trigger  -     fluticasone (FLONASE) 50 mcg/actuation nasal spray; 1 spray (50 mcg total) by  "Each Nare route once daily.  -     cetirizine (ZYRTEC) 10 MG tablet; Take 1 tablet (10 mg total) by mouth once daily.  Recommend otc non-sedating antihistamine and/or steroid nasal spray such as Flonase as directed and as needed.  Please return to clinic if symptoms persist after these interventions.  May add singular at night    Dizziness  Continue under ENT and neuropysch; meclizine PRN   Patient will let us know if he would like to see PCP for second opinion     Anxiety  Educated patient on stress reduction, eliminating triggers and relaxation techniques   Provided printed handout on ways to reduce anxiety  If no improvement or occurs more frequently, notify clinic for appointment     Morbid obesity with BMI of 40.0-44.9, adult  Counseled patient on his ideal body weight, health consequences of being obese and current recommendations including weekly exercise and a heart healthy diet.  Current BMI is:Estimated body mass index is 44.79 kg/m² as calculated from the following:    Height as of this encounter: 6' 4" (1.93 m).    Weight as of this encounter: 166.9 kg (367 lb 15.2 oz)..  Patient is aware that ideal BMI < 25 or Weight in (lb) to have BMI = 25: 205.        F/U prn or if symptoms fail to improve         "

## 2019-01-29 NOTE — TELEPHONE ENCOUNTER
Advised pt message from provider. Understanding verbalized. Offered pt earliest appointment today 01/29/19 at 3:40pm, accepted offer.

## 2019-01-29 NOTE — TELEPHONE ENCOUNTER
----- Message from Renetta Alexis LPN sent at 1/28/2019  5:07 PM CST -----  Contact: Patient      ----- Message -----  From: Renetta Alexis LPN  Sent: 1/28/2019   5:03 PM  To: Romeo Craig Staff        ----- Message -----  From: Carmina Blum  Sent: 1/28/2019   4:30 PM  To: Romeo Craig Staff    Type: Needs Medical Advice    Who Called:  Mr. Oshea  Symptoms (please be specific):  Coughing with white mucus  How long has patient had these symptoms:  1/26/19  Pharmacy name and phone #:    [id number] - [place] - [address]  [address]  [place] [address]  Phone: [phone] Fax: [phone]      Best Call Back Number: [phone]  Additional Information: Patient is stating that he has had a cough since Saturday with white mucus and would like Amoxicillin called in for it.Please call back and advise.

## 2019-02-28 ENCOUNTER — PATIENT OUTREACH (OUTPATIENT)
Dept: ADMINISTRATIVE | Facility: HOSPITAL | Age: 59
End: 2019-02-28

## 2019-02-28 DIAGNOSIS — Z12.11 COLON CANCER SCREENING: Primary | ICD-10-CM

## 2019-02-28 NOTE — LETTER
"February 28, 2019    Dayo Goodman  [address]  [place] [address]             [facility]  [address]  [facility] [address]  Phone: [phone] [facility] is committed to your overall health and would like to ensure that you are up to date on your recommended test and/or procedures.   Rafa Walters MD  has found that your chart shows you may be due for the following:    COLORECTAL CANCER SCREENING    If you have already had your screening, or you have made an appointment for your screening, congratulations!  You're on the road to good health. If you haven't signed up for a colorectal screening please accept this invitation to be screened.      According to the American Cancer Society, colon cancer is the third most common cancer for people in the [place].  A simple screening test "Fit Kit" - done in your own home - can help find colon cancer at an early stage when it can be treated, even before any signs or symptoms develop. THIS IS A YEARLY TEST.    Testing for blood in your stool (feces or bowel movement) is the first step. If you have an upcoming visit with your Primary Care Physician you can  a Fit Kit during your visit or you can  a Fit Kit at your Primary Care Clinic prior to your visit.    The Fit Kit includes:    · Instructions on how to perform the test  · (1) Sheet of tissue paper  · (1) Small Absorption pad  · (1) Bottle to hold the sample and a small probe to help you take the sample  · (1) Small plastic bio-hazard bag  · (1) Postage-paid return envelope    Please do your test (the instructions will tell you how) and then return your sample in the postage-paid return envelope within 24 hours of collection.     If your test results are negative, you won't need testing again for another year.  If results show you need more testing, we will call you with next steps.    Regular colorectal cancer screening is one of the most powerful weapons " "for preventing colon cancer.  The website https://www.cancer.org/cancer/colon-rectal-cancer.html can answer many of your questions about this cancer and its prevention.  Just search for "colorectal cancer".  If you have any questions please call [facility] TOUCH [phone] for assistance.  If you have had any of the above done at another facility, please let us know so that we may obtain copies from that facility.  If you have a copy of these records, please provide a copy for us to scan into your chart.  You are welcome to request that the report be faxed to us at  ([phone]).     Otherwise, please schedule these appointments at your earliest convenience by calling [phone] or going to [email or web address].    If you have an upcoming scheduled appointment for the item above, please disregard this letter.    Sincerely,  [facility]  MD Sofía Sanchez LPN Clinical Care Coordinator  [facility] [address] [place] [address]  Phone [phone]  Fax [phone]         "

## 2019-03-07 ENCOUNTER — LAB VISIT (OUTPATIENT)
Dept: LAB | Facility: HOSPITAL | Age: 59
End: 2019-03-07
Attending: FAMILY MEDICINE
Payer: COMMERCIAL

## 2019-03-07 LAB
ALBUMIN SERPL BCP-MCNC: 3.6 G/DL
ALP SERPL-CCNC: 69 U/L
ALT SERPL W/O P-5'-P-CCNC: 15 U/L
ANION GAP SERPL CALC-SCNC: 7 MMOL/L
AST SERPL-CCNC: 22 U/L
BASOPHILS # BLD AUTO: 0.06 K/UL
BASOPHILS NFR BLD: 0.7 %
BILIRUB SERPL-MCNC: 0.8 MG/DL
BUN SERPL-MCNC: 16 MG/DL
CALCIUM SERPL-MCNC: 10 MG/DL
CHLORIDE SERPL-SCNC: 102 MMOL/L
CHOLEST SERPL-MCNC: 184 MG/DL
CHOLEST/HDLC SERPL: 4.2 {RATIO}
CO2 SERPL-SCNC: 29 MMOL/L
CREAT SERPL-MCNC: 1.4 MG/DL
DIFFERENTIAL METHOD: ABNORMAL
EOSINOPHIL # BLD AUTO: 0.3 K/UL
EOSINOPHIL NFR BLD: 3.4 %
ERYTHROCYTE [DISTWIDTH] IN BLOOD BY AUTOMATED COUNT: 13.5 %
EST. GFR  (AFRICAN AMERICAN): >60 ML/MIN/1.73 M^2
EST. GFR  (NON AFRICAN AMERICAN): 54.6 ML/MIN/1.73 M^2
ESTIMATED AVG GLUCOSE: 120 MG/DL
GLUCOSE SERPL-MCNC: 88 MG/DL
HBA1C MFR BLD HPLC: 5.8 %
HCT VFR BLD AUTO: 42.7 %
HDLC SERPL-MCNC: 44 MG/DL
HDLC SERPL: 23.9 %
HGB BLD-MCNC: 13.6 G/DL
IMM GRANULOCYTES # BLD AUTO: 0.02 K/UL
IMM GRANULOCYTES NFR BLD AUTO: 0.2 %
LDLC SERPL CALC-MCNC: 116 MG/DL
LYMPHOCYTES # BLD AUTO: 2.8 K/UL
LYMPHOCYTES NFR BLD: 35 %
MCH RBC QN AUTO: 26.9 PG
MCHC RBC AUTO-ENTMCNC: 31.9 G/DL
MCV RBC AUTO: 85 FL
MONOCYTES # BLD AUTO: 0.6 K/UL
MONOCYTES NFR BLD: 7.2 %
NEUTROPHILS # BLD AUTO: 4.3 K/UL
NEUTROPHILS NFR BLD: 53.5 %
NONHDLC SERPL-MCNC: 140 MG/DL
NRBC BLD-RTO: 0 /100 WBC
PLATELET # BLD AUTO: 216 K/UL
PMV BLD AUTO: 11.6 FL
POTASSIUM SERPL-SCNC: 4.6 MMOL/L
PROT SERPL-MCNC: 7.6 G/DL
RBC # BLD AUTO: 5.05 M/UL
SODIUM SERPL-SCNC: 138 MMOL/L
TRIGL SERPL-MCNC: 120 MG/DL
TSH SERPL DL<=0.005 MIU/L-ACNC: 2.2 UIU/ML
WBC # BLD AUTO: 8.03 K/UL

## 2019-03-07 PROCEDURE — 83036 HEMOGLOBIN GLYCOSYLATED A1C: CPT

## 2019-03-07 PROCEDURE — 84443 ASSAY THYROID STIM HORMONE: CPT

## 2019-03-07 PROCEDURE — 80053 COMPREHEN METABOLIC PANEL: CPT

## 2019-03-07 PROCEDURE — 80061 LIPID PANEL: CPT

## 2019-03-07 PROCEDURE — 36415 COLL VENOUS BLD VENIPUNCTURE: CPT | Mod: PO

## 2019-03-07 PROCEDURE — 85025 COMPLETE CBC W/AUTO DIFF WBC: CPT

## 2019-03-13 ENCOUNTER — DOCUMENTATION ONLY (OUTPATIENT)
Dept: FAMILY MEDICINE | Facility: CLINIC | Age: 59
End: 2019-03-13

## 2019-03-13 NOTE — PROGRESS NOTES
Pre-Visit Chart Review  For Appointment Scheduled on 3/14/19    Health Maintenance Due   Topic Date Due    Colonoscopy  08/26/2009    Influenza Vaccine  08/01/2018

## 2019-03-14 ENCOUNTER — OFFICE VISIT (OUTPATIENT)
Dept: FAMILY MEDICINE | Facility: CLINIC | Age: 59
End: 2019-03-14
Payer: COMMERCIAL

## 2019-03-14 VITALS
SYSTOLIC BLOOD PRESSURE: 124 MMHG | WEIGHT: 315 LBS | TEMPERATURE: 98 F | HEIGHT: 76 IN | BODY MASS INDEX: 38.36 KG/M2 | HEART RATE: 52 BPM | DIASTOLIC BLOOD PRESSURE: 74 MMHG

## 2019-03-14 DIAGNOSIS — K21.9 GASTROESOPHAGEAL REFLUX DISEASE WITHOUT ESOPHAGITIS: ICD-10-CM

## 2019-03-14 DIAGNOSIS — Z12.11 SCREENING FOR COLON CANCER: Primary | ICD-10-CM

## 2019-03-14 PROCEDURE — 99999 PR PBB SHADOW E&M-EST. PATIENT-LVL III: CPT | Mod: PBBFAC,,, | Performed by: FAMILY MEDICINE

## 2019-03-14 PROCEDURE — 99396 PREV VISIT EST AGE 40-64: CPT | Mod: S$GLB,,, | Performed by: FAMILY MEDICINE

## 2019-03-14 PROCEDURE — 99999 PR PBB SHADOW E&M-EST. PATIENT-LVL III: ICD-10-PCS | Mod: PBBFAC,,, | Performed by: FAMILY MEDICINE

## 2019-03-14 PROCEDURE — 99396 PR PREVENTIVE VISIT,EST,40-64: ICD-10-PCS | Mod: S$GLB,,, | Performed by: FAMILY MEDICINE

## 2019-03-14 NOTE — PROGRESS NOTES
Subjective:   Patient ID: Dayo Goodman is a 58 y.o. male     Chief Complaint:Follow-up (6 months)      Patient here for annual checkup doing well.  Only complaint is dizziness related to his concussion several months ago and he follows up with Neurology later this month.  Otherwise patient doing well.  Patient is upset that he has gained 7 lb since last visit as he states he has been exercising and trying to eat.      Review of Systems   Constitutional: Negative for chills and fever.   HENT: Negative for sore throat.    Eyes: Negative for visual disturbance.   Respiratory: Negative for shortness of breath.    Cardiovascular: Negative for chest pain.   Gastrointestinal: Negative for abdominal pain.   Endocrine: Negative for polyuria.   Genitourinary: Negative for dysuria.   Musculoskeletal: Negative for back pain.   Skin: Negative for color change.   Neurological: Negative for headaches.   Psychiatric/Behavioral: Negative for agitation and confusion.     Past Medical History:   Diagnosis Date    Arthritis     Cyst, Baker's knee     L    GERD (gastroesophageal reflux disease)     Kidney stones     PVD (peripheral vascular disease)      Objective:     Vitals:    03/14/19 0926   BP: 124/74   Pulse: (!) 52   Temp: 98.2 °F (36.8 °C)     Body mass index is 44.36 kg/m².  Physical Exam   Constitutional: No distress.   HENT:   Head: Normocephalic and atraumatic.   Eyes: EOM are normal.   Cardiovascular: Normal rate and normal heart sounds.   Pulmonary/Chest: Effort normal.   Abdominal: Bowel sounds are normal.   Musculoskeletal: Normal range of motion.   Neurological: He is alert.   Psychiatric: He has a normal mood and affect.     Assessment:     1. Screening for colon cancer    2. BMI 40.0-44.9, adult    3. Gastroesophageal reflux disease without esophagitis      Plan:   Screening for colon cancer  -     Fecal Immunochemical Test (iFOBT); Future; Expected date: 03/14/2019    BMI 40.0-44.9, adult  The patient's BMI  has been recorded in the chart. The patient has been provided educational materials regarding the benefits of attaining and maintaining a normal weight. We will continue to address and follow this issue during follow up visits.    Discussed with patient in weight loss options including diet and exercise.  Patient is not interested in seeing a nutritionist at this time but is willing to consider this in the future.  Should the patient to follow up with any questions or concerns or requests.    Gastroesophageal reflux disease without esophagitis  Stable on PPI.      Patient currently up-to-date and was still  exception of a flu shot which he refused and colonoscopy which is not want done.  Patient is amenable to a fit kit testing and he has been sent with this.    Time spent with patient: 30 minutes and over half of that time was spent on counseling an coordination of care.    Rafa Walters MD  03/14/2019    Portions of this note have been dictated with WATSON Layton

## 2019-03-19 ENCOUNTER — TELEPHONE (OUTPATIENT)
Dept: CARDIOLOGY | Facility: CLINIC | Age: 59
End: 2019-03-19

## 2019-03-19 NOTE — TELEPHONE ENCOUNTER
----- Message from Janiya Navarrete sent at 3/18/2019  1:22 PM CDT -----  Contact: Dayo  Type: Needs Medical Advice    Who Called:  Dayo  Symptoms (please be specific):  fatigue  How long has patient had these sym  Best Call Back Number: [phone]  Additional Information: Pt is wanting to know if he has to make an appt, He is always feeling fatigued.

## 2019-03-19 NOTE — TELEPHONE ENCOUNTER
Called and spoke with patient.He is in process of seeing another specialist.he denies fatigue even though note stated he was having fatigue.He wanted to know when he should follow up.He has an appointment in July with Dr Piedra and he was instructed to contact us if he needs a sooner appointment.He agreed.

## 2019-04-25 RX ORDER — IBUPROFEN 800 MG/1
TABLET ORAL
Qty: 40 TABLET | Refills: 0 | Status: SHIPPED | OUTPATIENT
Start: 2019-04-25 | End: 2019-08-05 | Stop reason: SDUPTHER

## 2019-05-21 ENCOUNTER — TELEPHONE (OUTPATIENT)
Dept: FAMILY MEDICINE | Facility: CLINIC | Age: 59
End: 2019-05-21

## 2019-05-21 NOTE — TELEPHONE ENCOUNTER
----- Message from Ashley Garibay sent at 5/21/2019 10:43 AM CDT -----  Contact: Dayo  Type: Needs Medical Advice    Who Called:  patient  Best Call Back Number: [phone]  Additional Information: patient currently under worker's comp & last time he came to see Dr. Walters, his creatinine levels were elevated & Dr. Walters told him to see a Nephrologist--patient wants to know if this is still necessary or if labs can be redone? If orders are put in for labs, we have to make sure this is approved with [email or web address] department before it is scheduled--Please advise--thank you

## 2019-05-21 NOTE — TELEPHONE ENCOUNTER
Please see patient message.      His last labs were on 3/7/19, creatinine was 1.4 at the time.   Patient has no see a nephrologist, and he wonder if he still needs to see one.   he asking to redo labs   But he need to be sure worker's compensation will approve this.  Please advise

## 2019-05-22 NOTE — TELEPHONE ENCOUNTER
The patient is kidney function appears normal based on recent blood work.  Patient does not need to have this reordered.

## 2019-07-22 DIAGNOSIS — R60.0 BILATERAL LEG EDEMA: Primary | ICD-10-CM

## 2019-08-01 ENCOUNTER — TELEPHONE (OUTPATIENT)
Dept: FAMILY MEDICINE | Facility: CLINIC | Age: 59
End: 2019-08-01

## 2019-08-01 NOTE — TELEPHONE ENCOUNTER
"Pt c/o "kidney stone pain." He stated that he's sure that is the issue. Pt is not following nephrologist or urologist for this matter. I advised patient that he needs to be evaluate for this matter to make sure that we are treating correct sx. Pt stated he cannot make it today due to pain. I advised to increase water intake and keep himself hydrated. Schedule appt for tomorrow with LAXMI Santillan at 7am. Understanding verbalized.  "

## 2019-08-01 NOTE — TELEPHONE ENCOUNTER
----- Message from RT Coretta sent at 8/1/2019 11:02 AM CDT -----  Contact: pt    pt , requesting medication for pain to be called in for his possible kidney stone issues, having difficulty walking unable to drive,  thanks.

## 2019-08-05 RX ORDER — IBUPROFEN 800 MG/1
TABLET ORAL
Qty: 40 TABLET | Refills: 0 | Status: SHIPPED | OUTPATIENT
Start: 2019-08-05 | End: 2019-12-29

## 2019-08-08 ENCOUNTER — OFFICE VISIT (OUTPATIENT)
Dept: UROLOGY | Facility: CLINIC | Age: 59
End: 2019-08-08
Payer: COMMERCIAL

## 2019-08-08 VITALS
RESPIRATION RATE: 18 BRPM | HEART RATE: 56 BPM | WEIGHT: 315 LBS | SYSTOLIC BLOOD PRESSURE: 135 MMHG | DIASTOLIC BLOOD PRESSURE: 79 MMHG | HEIGHT: 76 IN | BODY MASS INDEX: 38.36 KG/M2

## 2019-08-08 DIAGNOSIS — N40.2 PROSTATE NODULE: ICD-10-CM

## 2019-08-08 DIAGNOSIS — Q54.0 CORONAL HYPOSPADIAS: ICD-10-CM

## 2019-08-08 DIAGNOSIS — N20.0 KIDNEY STONES: Primary | ICD-10-CM

## 2019-08-08 DIAGNOSIS — R10.9 LEFT FLANK PAIN: ICD-10-CM

## 2019-08-08 LAB
BILIRUB SERPL-MCNC: NORMAL MG/DL
BLOOD URINE, POC: NORMAL
COLOR, POC UA: YELLOW
GLUCOSE UR QL STRIP: NORMAL
KETONES UR QL STRIP: NORMAL
LEUKOCYTE ESTERASE URINE, POC: NORMAL
NITRITE, POC UA: NORMAL
PH, POC UA: 6
PROTEIN, POC: NORMAL
SPECIFIC GRAVITY, POC UA: 1
UROBILINOGEN, POC UA: NORMAL

## 2019-08-08 PROCEDURE — 99999 PR PBB SHADOW E&M-EST. PATIENT-LVL III: CPT | Mod: PBBFAC,,, | Performed by: UROLOGY

## 2019-08-08 PROCEDURE — 99204 PR OFFICE/OUTPT VISIT, NEW, LEVL IV, 45-59 MIN: ICD-10-PCS | Mod: 25,S$GLB,, | Performed by: UROLOGY

## 2019-08-08 PROCEDURE — 99999 PR PBB SHADOW E&M-EST. PATIENT-LVL III: ICD-10-PCS | Mod: PBBFAC,,, | Performed by: UROLOGY

## 2019-08-08 PROCEDURE — 99204 OFFICE O/P NEW MOD 45 MIN: CPT | Mod: 25,S$GLB,, | Performed by: UROLOGY

## 2019-08-08 PROCEDURE — 81002 POCT URINE DIPSTICK WITHOUT MICROSCOPE: ICD-10-PCS | Mod: S$GLB,,, | Performed by: UROLOGY

## 2019-08-08 PROCEDURE — 81002 URINALYSIS NONAUTO W/O SCOPE: CPT | Mod: S$GLB,,, | Performed by: UROLOGY

## 2019-08-08 RX ORDER — ACETAZOLAMIDE 500 MG/1
CAPSULE, EXTENDED RELEASE ORAL
Refills: 1 | COMMUNITY
Start: 2019-07-30

## 2019-08-08 NOTE — PROGRESS NOTES
[facility] [place]  Staff: MD Christin    Referring provider and please cc:   shruthi    PCP: Rafa Walters MD    [facility] Utilization: will sign up     Chief Complaint:   Chief Complaint   Patient presents with    Nephrolithiasis         Subjective:          HPI: Dayo Goodman is a 58 y.o. male     Patient has a history of kidney stones over 20 +years ago.  He had the stone removed and also sounds like he might have had some kind a ureteral dilation, he had no issues is the case.  He did have another stone and total 2015 when it was found on CT.  He had a 2 mm stone in the left kidney at that time.  He then presented to ER on 07/14/2016 with complaints of left flank pain and they did a repeat CT which showed the stone in the kidney had moved down to the distal ureter and he had mild hydro.  By the time he saw our nurse practitioner on 07/26/2016 he had already passed the stone.  Review of the CT from 2016 showed no other stones in the kidney.    He returns today and states he increased his water intake over the last 30 days and started having vague left flank pain which progressively worsened and by 8 1 08/01 it was pretty significant any thought he had another kidney stone.  It was radiating around to the front as well as frequency but he was also increasing his fluid intake.  The pain lasted for about 3 days and he had subjective chills.  Then the pain subsided and 4 days later.  He has had no pain since and that was 4 days ago.  He denied any hematuria.  Urinalysis today normal    No problems urinating     Urine history  Urinalysis void: neg  Urine history:   7/26/16 No cx, void: neg  7/21/16 No cx, void: neg  7/14/16 No cx, void: 3+bld (this was at time of stone)  7/7/16  No cx, void: neg  11/30/15 Ng, void: tr rbc/tr wbc  10/9/19 No cx, cath: neg    PSA history: no family hx of prostate cancer  7/21/16 0.34  3/24/16 0.37  5/10/13 0.38        REVIEW OF SYSTEMS:  General  ROS: no fevers, no chills  Psychological ROS: no depression  Endocrine ROS: no heat or cold  Respiratory ROS: no SOB  Cardiovascular ROS: no CP  Gastrointestinal ROS: no abdominal pain, no constipation, no diarrhea, noBRBPR  Musculoskeletal ROS: no muscle pain  Neurological ROS: no headaches  Dermatological ROS: no rashes  HEENT: noglasses, no sinus   ROS: per HPI     PMHx:  Past Medical History:   Diagnosis Date    Arthritis     Cyst, Baker's knee     L    GERD (gastroesophageal reflux disease)     Kidney stones     PVD (peripheral vascular disease)    sinus  Head trauma - 2017 (sees neurologist and ENT bc he has a leak- on diamox)  Umbilical hernia  Liver cyst      PSHx:  Past Surgical History:   Procedure Laterality Date    kidney stone removal      KNEE SURGERY      PERIPHERAL ARTERIAL STENT GRAFT      leg    SHOULDER SURGERY     BLE stents for PVD - on asa 81mg    Health Maintenance:  Health Maintenance Topics with due status: Not Due       Topic Last Completion Date    TETANUS VACCINE 2017    Lipid Panel 2019      Gastroenterologist: hasn't had one done, doesn't want one, no family hx of colon cancer, will do screening  Stents/Valves/Foreign Bodies/Cardiac: BLE stents Evaluation/Cardiologist:  [facility] Hx:   malignancies: none.   kidney stones: none  Parental history:  Parents  in 70s (mother from HF and father from alzheimers)    Soc Hx:  Social History     Tobacco Use    Smoking status: Never Smoker    Smokeless tobacco: Never Used   Substance Use Topics    Alcohol use: No    Drug use: No     Lives: [place]- works at [employer]   : unmarried  Patient's occupation: [employer] ([employer])  Children: 3  Hobby:     Allergies:  Patient has no known allergies.    Current Urologic Medications: none  Aspirin: 81mg   Anticoagulation: none    Objective:     Vitals:    19 0852   BP: 135/79   Pulse: (!) 56   Resp: 18     Body mass index is 44.55  kg/m².    General:WDWN in NAD  Eyes: PERRLA, normal conjunctiva  Respiratory: no increased work on breathing. No wheezing.   Cardiovascular: No obvious extremity edema. Warm and well perfused.   GI: no palpation of masses. No tenderness. No hepatosplenomegaly to palpation.  Musculoskeletal: normal range of motion of bilateral upper extremities. Normal muscle strength and tone.  Skin: no obvious rashes or lesions. No tightening of skin noted.  Neurologic: CN grossly normal. Normal sensation.   Psychiatric: awake, alert and oriented x 3. Mood and affect normal. Cooperative.     exam (08/08/2019):   Inspection of anus normal  No scrotal rashes, cysts or lesions  Epididymis normal in size, no tenderness  Testes normal and size, equal size bilaterally, no masses  Penis is not circumcised, easily retractable foreskin  Coronal hypospadias/ Urethral meatus normal without discharge  SUBHA: 20g gland without masses, tenderness. Pinpoint nodule at the right apex. SV not palpable. Normal sphincter tone.hemhorroids.  No bilateral inguinal hernias noted     LABS REVIEW:  Recent Labs   Lab 03/07/19  0950   WBC 8.03   Hemoglobin 13.6 L   Hematocrit 42.7   Platelets 216   ]  Recent Labs   Lab 10/21/16  1521 03/09/18  0813 03/07/19  0950   Sodium 140 141 138   Potassium 4.2 4.3 4.6   Chloride 104 103 102   CO2 29 30 H 29   BUN, Bld 12 16 16   Creatinine 1.2 1.3 1.4   Glucose 82 91 88   Calcium 8.9 9.8 10.0   Alkaline Phosphatase  --   --  69   Total Protein  --   --  7.6   Albumin  --   --  3.6   Total Bilirubin  --   --  0.8   AST  --   --  22   ALT  --   --  15   ]    Lab Results   Component Value Date    HGBA1C 5.8 (H) 03/07/2019           Pertinent urologic PATHOLOGY REVIEW:  none       Pertinent Urologic RADIOGRAPHIC REVIEW:  ctrss 7/14/16  1. Minimal prominence of the left renal collecting system and mild periureteral/perirenal inflammation, secondary to a 1-2 mm calculus within the distal left ureter. Correlation with  urinalysis recommended to exclude infection.  2. Stable right renal hypoattenuating lesion, remains nonspecific.  3. Additional findins above.  There is a hypoattenuating lesion within the right hepatic lobe measuring 1.9 cm, with nonspecific attenuation, essentially unchanged since the previous examination, it remains nonspecific. The liver is otherwise unremarkable. The spleen, pancreas, gallbladder and adrenal glands are grossly unremarkable noncontrast appearance. There is no biliary dilation or ascites. No significant abdominal lymphadenopathy.    Us abd 12/17/15  1.  Recently noted right hepatic lobe hypodensity correlates with a 1.8-cm right hepatic lobe simple cyst.  No solid hepatic mass appreciated.  2. No sonographically evident acute intra-abdominal process.    ctrss 11/30/15   2-mm left intrarenal stone without hydronephrosis or ureteral stone on either side.  1.8-cm hypodense mass of the dome of the liver may represent a cyst but ultrasound confirmation is recommended.  Bilateral iliac vein stents are noted in position.        Assessment:       1. Kidney stones    2. Left flank pain    3. Coronal hypospadias    4. Prostate nodule          Plan:     Kidney stones     He said he had a history of some kind of ureteral dilation?  Over 20 years ago and did fine until 2016 when he passed another stone.  He had no stones or issues until now and states that his pain increased with fluid intake.  I would like to go ahead and start with a CT renal stone study to look for stone or hydronephrosis.  However will keep in mind ureteral stricture versus UPJ.     He is on Diamox which can slightly increase his risk of kidney stones.  His PCP asked him to see if he needs to discontinue. He does not have to discontinue yet.  Would like to see what his CT looks like if has a lot of stones then can rediscuss.     He has coronal hypospadias, he did not know this.  Showed him a picture of what this was.  At this can  sometimes be associated with congenital abnormalities of kidney including hydronephrosis.      Will also get a BMP    He has a very tiny pinpoint nodule on the right apex.  The tip of the pin.  At this point I am not very concerned unless his PSA returned very high.  Or much higher.    The natural history of prostate cancer and ongoing controversy regarding screening and potential treatment outcomes of prostate cancer has been discussed with the patient. The meaning of a false positive PSA and a false negative PSA has been discussed. He indicates understanding of the limitations of this screening test and wishes  to proceed with screening PSA testing.    He was counseled to do the colon cancer screening    At minimum needs follow-up every 1 year   Will see in 6 months because of that pinpoint nodule          Lianet Waller MD

## 2019-08-08 NOTE — PATIENT INSTRUCTIONS
Kidney stones     He said he had a history of some kind of ureteral dilation?  Over 20 years ago and did fine until 2016 when he passed another stone.  He had no stones or issues until now and states that his pain increased with fluid intake.  I would like to go ahead and start with a CT renal stone study to look for stone or hydronephrosis.  However will keep in mind ureteral stricture versus UPJ.     He has coronal hypospadias, he did not know this.  Showed him a picture of what this was.  At this can sometimes be associated with congenital abnormalities of kidney including hydronephrosis.      Will also get a BMP    He has a very tiny pinpoint nodule on the right apex.  The tip of the pin.  At this point I am not very concerned unless his PSA returned very high.  Or much higher.    The natural history of prostate cancer and ongoing controversy regarding screening and potential treatment outcomes of prostate cancer has been discussed with the patient. The meaning of a false positive PSA and a false negative PSA has been discussed. He indicates understanding of the limitations of this screening test and wishes  to proceed with screening PSA testing.      At minimum needs follow-up every 1 year   Will see in 6 months because of that pinpoint nodule

## 2019-08-15 ENCOUNTER — LAB VISIT (OUTPATIENT)
Dept: LAB | Facility: HOSPITAL | Age: 59
End: 2019-08-15
Attending: UROLOGY
Payer: COMMERCIAL

## 2019-08-15 DIAGNOSIS — N20.0 KIDNEY STONES: ICD-10-CM

## 2019-08-15 DIAGNOSIS — R10.9 LEFT FLANK PAIN: ICD-10-CM

## 2019-08-15 LAB
ANION GAP SERPL CALC-SCNC: 6 MMOL/L (ref 8–16)
BUN SERPL-MCNC: 14 MG/DL (ref 6–20)
CALCIUM SERPL-MCNC: 9.3 MG/DL (ref 8.7–10.5)
CHLORIDE SERPL-SCNC: 104 MMOL/L (ref 95–110)
CO2 SERPL-SCNC: 30 MMOL/L (ref 23–29)
COMPLEXED PSA SERPL-MCNC: 0.44 NG/ML (ref 0–4)
CREAT SERPL-MCNC: 1.3 MG/DL (ref 0.5–1.4)
EST. GFR  (AFRICAN AMERICAN): >60 ML/MIN/1.73 M^2
EST. GFR  (NON AFRICAN AMERICAN): 59.7 ML/MIN/1.73 M^2
GLUCOSE SERPL-MCNC: 75 MG/DL (ref 70–110)
POTASSIUM SERPL-SCNC: 4.2 MMOL/L (ref 3.5–5.1)
SODIUM SERPL-SCNC: 140 MMOL/L (ref 136–145)

## 2019-08-15 PROCEDURE — 80048 BASIC METABOLIC PNL TOTAL CA: CPT

## 2019-08-15 PROCEDURE — 84153 ASSAY OF PSA TOTAL: CPT

## 2019-08-15 PROCEDURE — 36415 COLL VENOUS BLD VENIPUNCTURE: CPT | Mod: PO

## 2019-08-16 ENCOUNTER — TELEPHONE (OUTPATIENT)
Dept: UROLOGY | Facility: CLINIC | Age: 59
End: 2019-08-16

## 2019-08-16 DIAGNOSIS — N20.0 NEPHROLITHIASIS: Primary | ICD-10-CM

## 2019-08-20 NOTE — TELEPHONE ENCOUNTER
This is from my note:     He is on Diamox which can slightly increase his risk of kidney stones.  His PCP asked him to see if he needs to discontinue. He does not have to discontinue yet.  Would like to see what his CT looks like if has a lot of stones then can rediscuss.     It does not appear that he's had a ct scan yet. Let's see what his ct scan looks like and decide then. For now, continue as discussed above which you can review with him

## 2019-08-21 ENCOUNTER — TELEPHONE (OUTPATIENT)
Dept: UROLOGY | Facility: CLINIC | Age: 59
End: 2019-08-21

## 2019-08-21 NOTE — TELEPHONE ENCOUNTER
Patient states a ENT doctor prescribed medication for a concussion patient had a fissure. Patient states medication was suppose to limit dizziness and take pressure down in head. He states medication only makes him sick. Patient does not want to continue medication.

## 2019-08-21 NOTE — TELEPHONE ENCOUNTER
Spoke with patient informed him of recommendations. Patient verbally voiced understanding.  cell number given for  to give her a call.

## 2019-08-21 NOTE — TELEPHONE ENCOUNTER
Spoke with patient informed of recommendations. Patient verbally voiced understanding and wants to know since he is not having ct wants to know if he should continue diamox due to him believing this is what caused his stones in the beginning.

## 2019-08-21 NOTE — TELEPHONE ENCOUNTER
Yes, medication can cause kidney stones  I think he should at least have an xray and ultrasound  If it shows stones he should do a 24 hour urine  If there's nothing on his 24 hour urine to explain stones then it's likely the diamox    However, if he can try another medication instead then he should just proceed with that route  Again, please find out who his ent is and this would be better discussed with ent instead of back and forth with the pt.   Please contact the ent and see if I can get their phone number or give them mine and I can discuss

## 2019-08-21 NOTE — TELEPHONE ENCOUNTER
Spoke with patient informed him of recommendations below. Patient verbally voiced understanding and states he cannot afford a ct scan.

## 2019-08-21 NOTE — TELEPHONE ENCOUNTER
He was having flank pain which resolved and he has no blood in urine. As long as he is having no more flank pain and he does not see any blood in urine can hold off on repeat imaging. Last ct in 2016.     Should f/u in 6 months for repeat prostate exam due to that small pinpoint nodule I felt on his prostate. To make sure it doesn't get larger which could suggest prostate cancer. No repeat psa needed at that time.

## 2019-08-21 NOTE — TELEPHONE ENCOUNTER
Spoke with patient informed him of recommendations. Patient states ENT wants  recommendations on if medication is causing kidney stones. Patient states he is willing to do a cheaper scan(ultrasound or xray). Please advise

## 2019-08-21 NOTE — TELEPHONE ENCOUNTER
----- Message from Keerthi Menendez sent at 8/21/2019 10:55 AM CDT -----  Contact: patient  Type:  Patient Returning Call    Who Called:  patient  Who Left Message for Patient:  anila  Does the patient know what this is regarding?:  yes  Best Call Back Number:  [phone]   Additional Information:  Sent message to the pod. Thanks!

## 2019-08-27 ENCOUNTER — TELEPHONE (OUTPATIENT)
Dept: UROLOGY | Facility: CLINIC | Age: 59
End: 2019-08-27

## 2019-08-27 NOTE — TELEPHONE ENCOUNTER
Let pt know I spoke with , he recommends he should stay on the diamox for his dizziness. i''m ok with this. However pt has not had imaging since 2016. If he gets a rbus and kub and does a 24 hour urine then I would be able to better recommendations as to whether or not he really is at increased risk of making stones.     If he does not want to proceed with those tests, without blood in the urine and no imaging and his ENT who would prefer him to stay on med, I recommend staying on med.

## 2019-08-27 NOTE — TELEPHONE ENCOUNTER
Spoke with patient informed him of recommendations. Patient verbally voiced understanding. Patient is scheduled for ultrasound and xray tomorrow.

## 2019-08-28 ENCOUNTER — HOSPITAL ENCOUNTER (OUTPATIENT)
Dept: RADIOLOGY | Facility: HOSPITAL | Age: 59
Discharge: HOME | End: 2019-08-28
Attending: UROLOGY
Payer: COMMERCIAL

## 2019-08-28 DIAGNOSIS — N20.0 NEPHROLITHIASIS: ICD-10-CM

## 2019-08-28 PROCEDURE — 74018 RADEX ABDOMEN 1 VIEW: CPT | Mod: 26,,, | Performed by: RADIOLOGY

## 2019-08-28 PROCEDURE — 74018 RADEX ABDOMEN 1 VIEW: CPT | Mod: TC,FY

## 2019-08-28 PROCEDURE — 76770 US RETROPERITONEAL COMPLETE: ICD-10-PCS | Mod: 26,,, | Performed by: RADIOLOGY

## 2019-08-28 PROCEDURE — 74018 XR ABDOMEN AP 1 VIEW: ICD-10-PCS | Mod: 26,,, | Performed by: RADIOLOGY

## 2019-08-28 PROCEDURE — 76770 US EXAM ABDO BACK WALL COMP: CPT | Mod: 26,,, | Performed by: RADIOLOGY

## 2019-08-28 PROCEDURE — 76770 US EXAM ABDO BACK WALL COMP: CPT | Mod: TC

## 2019-09-26 ENCOUNTER — TELEPHONE (OUTPATIENT)
Dept: FAMILY MEDICINE | Facility: CLINIC | Age: 59
End: 2019-09-26

## 2019-09-26 NOTE — TELEPHONE ENCOUNTER
Patient reports Increase phlem at base of throat (thinks it is sinus drainage) /more prevalent when indoors in AC for 2 weeks. Pt wanted to know if he should take more allergy medicine or if this would be something else

## 2019-09-26 NOTE — TELEPHONE ENCOUNTER
----- Message from Evan Zhang sent at 9/26/2019  1:42 PM CDT -----  Contact: pt  Type: Needs Medical Advice    Who Called:  pt  Symptoms (please be specific):  Increase phlem at base of throat (thinks it is sinus drainage) /more prevalent when indoors in AC  How long has patient had these symptoms:  2 weeks  Pharmacy name and phone #:      [id number] [place] - [address]  [address]  [place] [address]  Phone: [phone] Fax: [phone]    Best Call Back Number: [phone]  Additional Information: pt wanted to know if he should take more allergy medicine or if this would be something else. Please call to advise

## 2019-09-26 NOTE — TELEPHONE ENCOUNTER
This is difficult to evaluate and make recommendations for over the phone.  He can try a different allergy medication called Xyzal this is over-the-counter and if symptoms do not improve he should schedule appointment  He should discontinue Zyrtec while on the Xyzal  Continue Flonase

## 2019-09-27 ENCOUNTER — OFFICE VISIT (OUTPATIENT)
Dept: FAMILY MEDICINE | Facility: CLINIC | Age: 59
End: 2019-09-27
Payer: COMMERCIAL

## 2019-09-27 VITALS
WEIGHT: 315 LBS | BODY MASS INDEX: 38.36 KG/M2 | OXYGEN SATURATION: 98 % | DIASTOLIC BLOOD PRESSURE: 72 MMHG | SYSTOLIC BLOOD PRESSURE: 116 MMHG | HEIGHT: 76 IN | TEMPERATURE: 98 F | HEART RATE: 56 BPM

## 2019-09-27 DIAGNOSIS — R09.82 POST-NASAL DRIP: Primary | ICD-10-CM

## 2019-09-27 PROCEDURE — 99213 OFFICE O/P EST LOW 20 MIN: CPT | Mod: S$GLB,,, | Performed by: PHYSICIAN ASSISTANT

## 2019-09-27 PROCEDURE — 99999 PR PBB SHADOW E&M-EST. PATIENT-LVL IV: ICD-10-PCS | Mod: PBBFAC,,, | Performed by: PHYSICIAN ASSISTANT

## 2019-09-27 PROCEDURE — 3008F BODY MASS INDEX DOCD: CPT | Mod: CPTII,S$GLB,, | Performed by: PHYSICIAN ASSISTANT

## 2019-09-27 PROCEDURE — 99999 PR PBB SHADOW E&M-EST. PATIENT-LVL IV: CPT | Mod: PBBFAC,,, | Performed by: PHYSICIAN ASSISTANT

## 2019-09-27 PROCEDURE — 3008F PR BODY MASS INDEX (BMI) DOCUMENTED: ICD-10-PCS | Mod: CPTII,S$GLB,, | Performed by: PHYSICIAN ASSISTANT

## 2019-09-27 PROCEDURE — 99213 PR OFFICE/OUTPT VISIT, EST, LEVL III, 20-29 MIN: ICD-10-PCS | Mod: S$GLB,,, | Performed by: PHYSICIAN ASSISTANT

## 2019-09-27 RX ORDER — LEVOCETIRIZINE DIHYDROCHLORIDE 5 MG/1
5 TABLET, FILM COATED ORAL NIGHTLY
Qty: 30 TABLET | Refills: 2 | Status: SHIPPED | OUTPATIENT
Start: 2019-09-27 | End: 2020-03-16 | Stop reason: SDUPTHER

## 2019-09-27 NOTE — PROGRESS NOTES
Subjective:       Patient ID: Dayo Goodman is a 59 y.o. male.    Chief Complaint: Sinus Problem ((2 week) Flem building up in throat,pt states he's having trouble breathing) and Cough    Sinus Problem   This is a new problem. The current episode started 1 to 4 weeks ago. The problem is unchanged. There has been no fever. Associated symptoms include coughing, sneezing and a sore throat. Pertinent negatives include no chills, congestion, ear pain, headaches or sinus pressure. Treatments tried: flonase and zyrtec  The treatment provided mild relief.     Review of Systems   Constitutional: Negative for chills.   HENT: Positive for postnasal drip, sneezing, sore throat and voice change. Negative for congestion, ear pain, rhinorrhea, sinus pressure and sinus pain.    Respiratory: Positive for cough. Negative for chest tightness and wheezing.    Neurological: Negative for headaches.       Objective:      Physical Exam   Constitutional: He is cooperative.   Obese body habitus    HENT:   Head: Normocephalic and atraumatic.   Right Ear: Tympanic membrane, external ear and ear canal normal.   Left Ear: Tympanic membrane, external ear and ear canal normal.   Nose: Mucosal edema present. No rhinorrhea. Right sinus exhibits no maxillary sinus tenderness and no frontal sinus tenderness. Left sinus exhibits no maxillary sinus tenderness and no frontal sinus tenderness.   Mouth/Throat: Posterior oropharyngeal erythema (cobblestoning ) present.   Cardiovascular: Normal rate, regular rhythm and normal heart sounds.   Pulmonary/Chest: Effort normal and breath sounds normal.   Neurological: He is alert.   Vitals reviewed.      Assessment:       1. Post-nasal drip    2. BMI 40.0-44.9, adult        Plan:       Dayo was seen today for sinus problem and cough.    Diagnoses and all orders for this visit:    Post-nasal drip  -     levocetirizine (XYZAL) 5 MG tablet; Take 1 tablet (5 mg total) by mouth every evening.    BMI 40.0-44.9, adult  -      Ambulatory Referral to Bariatric Medicine

## 2019-10-22 ENCOUNTER — CLINICAL SUPPORT (OUTPATIENT)
Dept: CARDIOLOGY | Facility: CLINIC | Age: 59
End: 2019-10-22
Payer: COMMERCIAL

## 2019-10-22 ENCOUNTER — OFFICE VISIT (OUTPATIENT)
Dept: CARDIOLOGY | Facility: CLINIC | Age: 59
End: 2019-10-22
Payer: COMMERCIAL

## 2019-10-22 VITALS
DIASTOLIC BLOOD PRESSURE: 76 MMHG | HEART RATE: 55 BPM | WEIGHT: 315 LBS | HEIGHT: 76 IN | BODY MASS INDEX: 38.36 KG/M2 | SYSTOLIC BLOOD PRESSURE: 117 MMHG

## 2019-10-22 DIAGNOSIS — R60.0 BILATERAL LEG EDEMA: ICD-10-CM

## 2019-10-22 DIAGNOSIS — R42 DIZZINESS: ICD-10-CM

## 2019-10-22 DIAGNOSIS — I87.2 VENOUS STASIS DERMATITIS, UNSPECIFIED LATERALITY: Primary | ICD-10-CM

## 2019-10-22 DIAGNOSIS — R00.1 BRADYCARDIA: ICD-10-CM

## 2019-10-22 PROCEDURE — 93000 EKG 12-LEAD: ICD-10-PCS | Mod: S$GLB,,, | Performed by: INTERNAL MEDICINE

## 2019-10-22 PROCEDURE — 3008F PR BODY MASS INDEX (BMI) DOCUMENTED: ICD-10-PCS | Mod: CPTII,S$GLB,, | Performed by: INTERNAL MEDICINE

## 2019-10-22 PROCEDURE — 99999 PR PBB SHADOW E&M-EST. PATIENT-LVL III: CPT | Mod: PBBFAC,,, | Performed by: INTERNAL MEDICINE

## 2019-10-22 PROCEDURE — 99214 PR OFFICE/OUTPT VISIT, EST, LEVL IV, 30-39 MIN: ICD-10-PCS | Mod: S$GLB,,, | Performed by: INTERNAL MEDICINE

## 2019-10-22 PROCEDURE — 99999 PR PBB SHADOW E&M-EST. PATIENT-LVL III: ICD-10-PCS | Mod: PBBFAC,,, | Performed by: INTERNAL MEDICINE

## 2019-10-22 PROCEDURE — 93000 ELECTROCARDIOGRAM COMPLETE: CPT | Mod: S$GLB,,, | Performed by: INTERNAL MEDICINE

## 2019-10-22 PROCEDURE — 3008F BODY MASS INDEX DOCD: CPT | Mod: CPTII,S$GLB,, | Performed by: INTERNAL MEDICINE

## 2019-10-22 PROCEDURE — 99214 OFFICE O/P EST MOD 30 MIN: CPT | Mod: S$GLB,,, | Performed by: INTERNAL MEDICINE

## 2019-10-22 NOTE — PROGRESS NOTES
Subjective:   Patient ID:  Dayo Goodman is a 59 y.o. male who presents for follow-up of No chief complaint on file.  LE edema stable.Patient denies CP, angina or anginal equivalent.Echo- 2016 nml  EKG shows sinus bradycardia 46 bpm, no sx  Edema   This is a recurrent problem. The current episode started 1 to 4 weeks ago. The problem occurs intermittently. The problem has been gradually improving. Pertinent negatives include no chest pain. Nothing aggravates the symptoms. He has tried rest for the symptoms. The treatment provided mild relief.   Dizziness:   Chronicity:  Recurrent  Onset:  More than 1 month ago  Progression since onset:  Waxing and waning  Severity:  Mild  Dizziness characteristics:  Off-balanceno palpitations and no chest pain.  Aggravated by:  Position changes  Treatments tried:  Rest  Improvements on treatment:  Moderate      Review of Systems   Constitution: Negative. Negative for weight gain.   HENT: Negative.    Eyes: Negative.    Cardiovascular: Negative.  Negative for chest pain, leg swelling and palpitations.   Respiratory: Negative.  Negative for shortness of breath.    Endocrine: Negative.    Hematologic/Lymphatic: Negative.    Skin: Negative.    Musculoskeletal: Negative for muscle weakness.   Gastrointestinal: Negative.    Genitourinary: Negative.    Neurological: Negative.  Negative for dizziness.   Psychiatric/Behavioral: Negative.    Allergic/Immunologic: Negative.      Family History   Problem Relation Age of Onset    Heart disease Mother     Cancer Father     Stroke Sister      Past Medical History:   Diagnosis Date    Arthritis     Cyst, Baker's knee     L    GERD (gastroesophageal reflux disease)     Kidney stones     PVD (peripheral vascular disease)      Social History     Socioeconomic History    Marital status: Single     Spouse name: Not on file    Number of children: Not on file    Years of education: Not on file    Highest education level: Not on file    Occupational History    Not on file   Social Needs    Financial resource strain: Not on file    Food insecurity:     Worry: Not on file     Inability: Not on file    Transportation needs:     Medical: Not on file     Non-medical: Not on file   Tobacco Use    Smoking status: Never Smoker    Smokeless tobacco: Never Used   Substance and Sexual Activity    Alcohol use: No    Drug use: No    Sexual activity: Not on file   Lifestyle    Physical activity:     Days per week: Not on file     Minutes per session: Not on file    Stress: Not on file   Relationships    Social connections:     Talks on phone: Not on file     Gets together: Not on file     Attends Voodoo service: Not on file     Active member of club or organization: Not on file     Attends meetings of clubs or organizations: Not on file     Relationship status: Not on file   Other Topics Concern    Not on file   Social History Narrative    Not on file     Current Outpatient Medications on File Prior to Visit   Medication Sig Dispense Refill    acetaZOLAMIDE (DIAMOX) 500 mg CpSR TAKE ONE CAPSULE BY MOUTH AT BEDTIME FOR 1 WEEK. THEN TWICE A DAY THERE AFTER  1    aspirin (ECOTRIN) 81 MG EC tablet Take 81 mg by mouth once daily.      fluticasone (FLONASE) 50 mcg/actuation nasal spray 1 spray (50 mcg total) by Each Nare route once daily. 1 Bottle 11     mg tablet TAKE 1 TABLET(800 MG) BY MOUTH THREE TIMES DAILY 40 tablet 0    levocetirizine (XYZAL) 5 MG tablet Take 1 tablet (5 mg total) by mouth every evening. 30 tablet 2    omeprazole magnesium (PRILOSEC ORAL) Prilosec   2 X'S A DAY      omeprazole (PRILOSEC) 40 MG capsule Take 1 capsule (40 mg total) by mouth once daily. 90 capsule 1     No current facility-administered medications on file prior to visit.      Review of patient's allergies indicates:  No Known Allergies    Objective:     Physical Exam   Constitutional: He is oriented to person, place, and time. He appears  well-developed and well-nourished.   HENT:   Head: Normocephalic and atraumatic.   Eyes: Pupils are equal, round, and reactive to light. Conjunctivae are normal.   Neck: Normal range of motion. Neck supple.   Cardiovascular: Normal rate, regular rhythm, normal heart sounds and intact distal pulses.   Pulmonary/Chest: Effort normal and breath sounds normal.   Abdominal: Soft. Bowel sounds are normal.   Musculoskeletal: He exhibits edema.   Neurological: He is alert and oriented to person, place, and time.   Skin: Skin is warm and dry.   Psychiatric: He has a normal mood and affect.   Nursing note and vitals reviewed.      Assessment:     1. Venous stasis dermatitis, unspecified laterality    2. BMI 40.0-44.9, adult    3. Bilateral leg edema    4. Dizziness        Plan:     Venous stasis dermatitis, unspecified laterality    BMI 40.0-44.9, adult    Bilateral leg edema    Dizziness    Continue risk factor modification  exercise

## 2019-11-20 ENCOUNTER — OFFICE VISIT (OUTPATIENT)
Dept: FAMILY MEDICINE | Facility: CLINIC | Age: 59
End: 2019-11-20
Payer: COMMERCIAL

## 2019-11-20 VITALS
HEIGHT: 76 IN | WEIGHT: 315 LBS | TEMPERATURE: 99 F | HEART RATE: 64 BPM | SYSTOLIC BLOOD PRESSURE: 110 MMHG | OXYGEN SATURATION: 96 % | DIASTOLIC BLOOD PRESSURE: 76 MMHG | BODY MASS INDEX: 38.36 KG/M2

## 2019-11-20 DIAGNOSIS — J20.0 ACUTE BRONCHITIS DUE TO MYCOPLASMA PNEUMONIAE: Primary | ICD-10-CM

## 2019-11-20 PROCEDURE — 99214 PR OFFICE/OUTPT VISIT, EST, LEVL IV, 30-39 MIN: ICD-10-PCS | Mod: 25,S$GLB,, | Performed by: FAMILY MEDICINE

## 2019-11-20 PROCEDURE — 96372 PR INJECTION,THERAP/PROPH/DIAG2ST, IM OR SUBCUT: ICD-10-PCS | Mod: S$GLB,,, | Performed by: FAMILY MEDICINE

## 2019-11-20 PROCEDURE — 3008F BODY MASS INDEX DOCD: CPT | Mod: CPTII,S$GLB,, | Performed by: FAMILY MEDICINE

## 2019-11-20 PROCEDURE — 96372 THER/PROPH/DIAG INJ SC/IM: CPT | Mod: S$GLB,,, | Performed by: FAMILY MEDICINE

## 2019-11-20 PROCEDURE — 99999 PR PBB SHADOW E&M-EST. PATIENT-LVL III: ICD-10-PCS | Mod: PBBFAC,,, | Performed by: FAMILY MEDICINE

## 2019-11-20 PROCEDURE — 3008F PR BODY MASS INDEX (BMI) DOCUMENTED: ICD-10-PCS | Mod: CPTII,S$GLB,, | Performed by: FAMILY MEDICINE

## 2019-11-20 PROCEDURE — 99999 PR PBB SHADOW E&M-EST. PATIENT-LVL III: CPT | Mod: PBBFAC,,, | Performed by: FAMILY MEDICINE

## 2019-11-20 PROCEDURE — 99214 OFFICE O/P EST MOD 30 MIN: CPT | Mod: 25,S$GLB,, | Performed by: FAMILY MEDICINE

## 2019-11-20 RX ORDER — BETAMETHASONE SODIUM PHOSPHATE AND BETAMETHASONE ACETATE 3; 3 MG/ML; MG/ML
9 INJECTION, SUSPENSION INTRA-ARTICULAR; INTRALESIONAL; INTRAMUSCULAR; SOFT TISSUE
Status: COMPLETED | OUTPATIENT
Start: 2019-11-20 | End: 2019-11-20

## 2019-11-20 RX ORDER — ALBUTEROL SULFATE 90 UG/1
2 AEROSOL, METERED RESPIRATORY (INHALATION) 4 TIMES DAILY
Qty: 1 INHALER | Refills: 1 | Status: SHIPPED | OUTPATIENT
Start: 2019-11-20

## 2019-11-20 RX ORDER — AZITHROMYCIN 250 MG/1
TABLET, FILM COATED ORAL
Qty: 6 TABLET | Refills: 0 | Status: SHIPPED | OUTPATIENT
Start: 2019-11-20 | End: 2020-03-16 | Stop reason: ALTCHOICE

## 2019-11-20 RX ORDER — HYDROCODONE POLISTIREX AND CHLORPHENIRAMINE POLISTIREX 10; 8 MG/5ML; MG/5ML
5 SUSPENSION, EXTENDED RELEASE ORAL EVERY 12 HOURS PRN
Qty: 70 ML | Refills: 0 | Status: SHIPPED | OUTPATIENT
Start: 2019-11-20 | End: 2019-11-30

## 2019-11-20 RX ADMIN — BETAMETHASONE SODIUM PHOSPHATE AND BETAMETHASONE ACETATE 9 MG: 3; 3 INJECTION, SUSPENSION INTRA-ARTICULAR; INTRALESIONAL; INTRAMUSCULAR; SOFT TISSUE at 10:11

## 2019-11-20 NOTE — PROGRESS NOTES
Identified patient's name and . Administered 9 mg celestone, IM. Patient tolerated well, aseptic technique maintained. Pain scale 0/10 with injection.

## 2019-11-20 NOTE — PROGRESS NOTES
Subjective:       Patient ID: Dayo Goodman is a 59 y.o. male.    Chief Complaint: Cough and Nasal Congestion    Patient here for UC visit.    Cough   This is a new problem. The current episode started in the past 7 days. The problem has been waxing and waning. The problem occurs hourly. Cough characteristics: some non-productive and occ some white sputum. Associated symptoms include chills, a fever, headaches and shortness of breath. Pertinent negatives include no chest pain, hemoptysis, rash or wheezing.     Review of Systems   Constitutional: Positive for chills and fever.   Respiratory: Positive for cough and shortness of breath. Negative for hemoptysis and wheezing.    Cardiovascular: Negative for chest pain.   Gastrointestinal: Negative for abdominal pain and nausea.   Skin: Negative for rash.   Neurological: Positive for headaches. Negative for numbness.   All other systems reviewed and are negative.      Objective:      Physical Exam   Constitutional: He appears well-developed. No distress.   HENT:   Right Ear: Tympanic membrane normal.   Left Ear: Tympanic membrane normal.   Nose: Mucosal edema present.   Mouth/Throat: Posterior oropharyngeal erythema present.   Neck: Neck supple.   Cardiovascular: Normal rate and regular rhythm.   No murmur heard.  Pulmonary/Chest: Effort normal and breath sounds normal. He has no wheezes. He has no rales.   Lymphadenopathy:     He has no cervical adenopathy.   Skin: Skin is warm and dry.       Assessment:       1. Acute bronchitis due to Mycoplasma pneumoniae        Plan:       Acute bronchitis due to Mycoplasma pneumoniae  -     azithromycin (Z-EMILEE) 250 MG tablet; As directed on pack  Dispense: 6 tablet; Refill: 0  -     albuterol (PROVENTIL/VENTOLIN HFA) 90 mcg/actuation inhaler; Inhale 2 puffs into the lungs 4 (four) times daily.  Dispense: 1 Inhaler; Refill: 1  -     hydrocodone-chlorpheniramine (TUSSIONEX) 10-8 mg/5 mL suspension; Take 5 mLs by mouth every 12 (twelve)  hours as needed.  Dispense: 70 mL; Refill: 0  -     betamethasone acetate-betamethasone sodium phosphate injection 9 mg

## 2019-12-29 RX ORDER — IBUPROFEN 800 MG/1
TABLET ORAL
Qty: 40 TABLET | Refills: 0 | Status: SHIPPED | OUTPATIENT
Start: 2019-12-29 | End: 2020-01-15

## 2020-01-15 RX ORDER — IBUPROFEN 800 MG/1
TABLET ORAL
Qty: 40 TABLET | Refills: 0 | Status: SHIPPED | OUTPATIENT
Start: 2020-01-15

## 2020-03-02 ENCOUNTER — PATIENT OUTREACH (OUTPATIENT)
Dept: ADMINISTRATIVE | Facility: HOSPITAL | Age: 60
End: 2020-03-02

## 2020-03-02 NOTE — LETTER
"March 2, 2020    Dayo DARREN Maxine  [address]  [place] [address]             [facility]  [address]  [facility] [address]  Phone: [phone] [facility] is committed to your overall health.  To help you get the most out of each of your visits, we will review your information to make sure you are up to date on all of your recommended tests and/or procedures.      Dr. Rafa Walters MD has found that your chart shows you may be due for a:    COLORECTAL CANCER SCREENING    Our records show you are due for colon cancer screening.  If you have already had your screening, or you have made an appointment for your screening, congratulations!  You're on the road to good health. If you haven't signed up for a colorectal screening please accept this invitation to be screened.      According to the American Cancer Society, colon cancer is the third most common cancer for people in the [place].  A simple screening test "Fit Kit" - done in your own home - can help find colon cancer at an early stage when it can be treated, even before any signs or symptoms develop. THIS IS A YEARLY TEST.    Testing for blood in your stool (feces or bowel movement) is the first step. If you have an upcoming visit with your Primary Care Physician you can  a Fit Kit during your visit or you can  a Fit Kit at your Primary Care Clinic prior to your visit.    The Fit Kit includes:    · Instructions on how to perform the test  · (1) Sheet of tissue paper  · (1) Small Absorption pad  · (1) Bottle to hold the sample and a small probe to help you take the sample  · (1) Small plastic bio-hazard bag  · (1) Postage-paid return envelope    Please do your test (the instructions will tell you how) and then return your sample in the postage-paid return envelope within 24 hours of collection.     If your test results are negative, you won't need testing again for another year.  If results show you need more " "testing, we will call you with next steps.    Regular colorectal cancer screening is one of the most powerful weapons for preventing colon cancer.  The website https://www.cancer.org/cancer/colon-rectal-cancer.html can answer many of your questions about this cancer and its prevention.  Just search for "colorectal cancer".         If you have had any of the above done at another facility, please bring the records or information with you so that your record at [facility] will be complete.  If you would like to schedule any of these, please contact the clinic at [phone].    If you are currently taking medication, please bring it with you to your appointment for review.    Also, if you have any type of Advanced Directives, please bring them with you to your office visit so we may scan them into your chart.    Thank You,    Your [facility] Team,  MD Doris Shea LPN Clinical Care Coordinator  [facility] [address] [place] [address]  Phone [phone]  Fax [phone]       "

## 2020-03-02 NOTE — PROGRESS NOTES
Chart review completed 03/02/2020.  Care Everywhere updates requested and reviewed.  Immunizations reconciled. Media reviewed.     Letter mailed.  Yes    Health Maintenance Due   Topic Date Due    HIV Screening  08/26/1975    Shingles Vaccine (1 of 2) 08/26/2009    Colonoscopy  08/26/2009    Influenza Vaccine (1) 09/01/2019

## 2020-03-13 ENCOUNTER — DOCUMENTATION ONLY (OUTPATIENT)
Dept: FAMILY MEDICINE | Facility: CLINIC | Age: 60
End: 2020-03-13

## 2020-03-13 NOTE — PROGRESS NOTES
Pre-Visit Chart Review  For Appointment Scheduled on 3/16/2020    Health Maintenance Due   Topic Date Due    Colonoscopy  08/26/2009

## 2020-03-16 ENCOUNTER — OFFICE VISIT (OUTPATIENT)
Dept: FAMILY MEDICINE | Facility: CLINIC | Age: 60
End: 2020-03-16
Payer: COMMERCIAL

## 2020-03-16 VITALS
TEMPERATURE: 98 F | SYSTOLIC BLOOD PRESSURE: 112 MMHG | BODY MASS INDEX: 38.36 KG/M2 | HEART RATE: 63 BPM | OXYGEN SATURATION: 97 % | WEIGHT: 315 LBS | DIASTOLIC BLOOD PRESSURE: 78 MMHG | HEIGHT: 76 IN

## 2020-03-16 DIAGNOSIS — K59.00 CONSTIPATION, UNSPECIFIED CONSTIPATION TYPE: Primary | ICD-10-CM

## 2020-03-16 DIAGNOSIS — R09.82 POST-NASAL DRIP: ICD-10-CM

## 2020-03-16 DIAGNOSIS — J30.89 SEASONAL ALLERGIC RHINITIS DUE TO OTHER ALLERGIC TRIGGER: ICD-10-CM

## 2020-03-16 PROCEDURE — 99396 PR PREVENTIVE VISIT,EST,40-64: ICD-10-PCS | Mod: S$GLB,,, | Performed by: FAMILY MEDICINE

## 2020-03-16 PROCEDURE — 99999 PR PBB SHADOW E&M-EST. PATIENT-LVL III: CPT | Mod: PBBFAC,,, | Performed by: FAMILY MEDICINE

## 2020-03-16 PROCEDURE — 99999 PR PBB SHADOW E&M-EST. PATIENT-LVL III: ICD-10-PCS | Mod: PBBFAC,,, | Performed by: FAMILY MEDICINE

## 2020-03-16 PROCEDURE — 99396 PREV VISIT EST AGE 40-64: CPT | Mod: S$GLB,,, | Performed by: FAMILY MEDICINE

## 2020-03-16 RX ORDER — LEVOCETIRIZINE DIHYDROCHLORIDE 5 MG/1
5 TABLET, FILM COATED ORAL NIGHTLY
Qty: 90 TABLET | Refills: 3 | Status: SHIPPED | OUTPATIENT
Start: 2020-03-16 | End: 2021-03-16

## 2020-03-16 RX ORDER — FLUTICASONE PROPIONATE 50 MCG
1 SPRAY, SUSPENSION (ML) NASAL DAILY
Qty: 16 G | Refills: 11 | Status: SHIPPED | OUTPATIENT
Start: 2020-03-16 | End: 2021-03-16

## 2020-03-16 NOTE — PROGRESS NOTES
Subjective:   Patient ID: Dayo Goodman is a 59 y.o. male     Chief Complaint:Annual Exam      Patient here for checkup.  Patient overall doing well.  Patient does endorse some issues with rhinorrhea congestion.  Patient also endorses some issues with constipation.    Review of Systems   Constitutional: Negative for chills and fever.   HENT: Negative for sore throat.    Eyes: Negative for visual disturbance.   Respiratory: Negative for shortness of breath.    Cardiovascular: Negative for chest pain.   Gastrointestinal: Negative for abdominal pain.   Endocrine: Negative for polyuria.   Genitourinary: Negative for dysuria.   Musculoskeletal: Negative for back pain.   Skin: Negative for color change.   Neurological: Negative for headaches.   Psychiatric/Behavioral: Negative for agitation and confusion.     Past Medical History:   Diagnosis Date    Arthritis     Cyst, Baker's knee     L    GERD (gastroesophageal reflux disease)     Kidney stones     PVD (peripheral vascular disease)      Objective:     Vitals:    03/16/20 1039   BP: 112/78   Pulse: 63   Temp: 97.8 °F (36.6 °C)     Body mass index is 42 kg/m².  Physical Exam   Constitutional: No distress.   HENT:   Head: Normocephalic and atraumatic.   Eyes: EOM are normal.   Cardiovascular: Normal rate and normal heart sounds.   Pulmonary/Chest: Effort normal.   Abdominal: Bowel sounds are normal.   Musculoskeletal: Normal range of motion.   Neurological: He is alert.   Psychiatric: He has a normal mood and affect.     Assessment:     1. Constipation, unspecified constipation type    2. Post-nasal drip    3. Seasonal allergic rhinitis due to other allergic trigger      Plan:   Constipation, unspecified constipation type  -     docusate sodium (COLACE) 50 MG capsule; Take 1 capsule (50 mg total) by mouth 2 (two) times daily.  Dispense: 180 capsule; Refill: 3    Post-nasal drip  -     levocetirizine (XYZAL) 5 MG tablet; Take 1 tablet (5 mg total) by mouth every  evening.  Dispense: 90 tablet; Refill: 3    Seasonal allergic rhinitis due to other allergic trigger  -     fluticasone propionate (FLONASE) 50 mcg/actuation nasal spray; 1 spray (50 mcg total) by Each Nostril route once daily.  Dispense: 16 g; Refill: 11            Rafa Walters MD  03/16/2020    Portions of this note have been dictated with M Luz Marina.

## 2020-05-15 DIAGNOSIS — Z12.11 COLON CANCER SCREENING: ICD-10-CM

## 2020-07-07 ENCOUNTER — TELEPHONE (OUTPATIENT)
Dept: FAMILY MEDICINE | Facility: CLINIC | Age: 60
End: 2020-07-07

## 2020-07-07 NOTE — TELEPHONE ENCOUNTER
----- Message from Grace Sorenson sent at 7/7/2020  3:38 PM CDT -----  Regarding: return phone call  Contact: self  Type:  Patient Returning Call    Who Called:  self  Who Left Message for Patient:    Does the patient know what this is regarding?:    Best Call Back Number:  [phone]  Additional Information:

## 2020-07-07 NOTE — TELEPHONE ENCOUNTER
----- Message from Keerthi Menendez sent at 7/7/2020 11:03 AM CDT -----  Regarding: questions  Contact: patient  Type: Needs Medical Advice  Who Called:  patient  Best Call Back Number: [phone]  Additional Information: Patient has questions for the nurse.  Patient refused to give me any other details about the problems he is having.  Please call to advise.  Thanks!

## 2020-07-07 NOTE — TELEPHONE ENCOUNTER
Patient stated he is claustrophobic and when he wears the mask it causes him panic attacks.  He is requesting a letter stating that he can not wear a mask due to these issues. Please advise.

## 2020-09-16 NOTE — TELEPHONE ENCOUNTER
Spoke with patient he states he just received a phone call from our office stating his condition could not be discussed until he speak to someone at his job, and that he found that call to be strange since we are his primary care physician.      I apologized and informed patient that dr. Kumar was our on leave, but he is checking his messages.    1. He would like dr. Kumar to review his CT results that was done here yesterday, because is is having dizziness and would like to know if this is normal after a fall, if so then how long is this expected to last? .    2. Do Dr. Kumar think he need to see a neurologist? , if so he need an referral.      Please advise   n/a

## 2020-10-21 ENCOUNTER — TELEPHONE (OUTPATIENT)
Dept: CARDIOLOGY | Facility: CLINIC | Age: 60
End: 2020-10-21

## 2021-05-18 DIAGNOSIS — Z12.11 COLON CANCER SCREENING: ICD-10-CM

## 2021-06-22 RX ORDER — IBUPROFEN 800 MG/1
800 TABLET ORAL 3 TIMES DAILY
Qty: 40 TABLET | Refills: 0 | Status: CANCELLED | OUTPATIENT
Start: 2021-06-22

## 2021-06-23 ENCOUNTER — TELEPHONE (OUTPATIENT)
Dept: FAMILY MEDICINE | Facility: CLINIC | Age: 61
End: 2021-06-23

## 2021-11-29 ENCOUNTER — PATIENT OUTREACH (OUTPATIENT)
Dept: ADMINISTRATIVE | Facility: HOSPITAL | Age: 61
End: 2021-11-29
Payer: OTHER MISCELLANEOUS

## 2022-08-29 NOTE — TELEPHONE ENCOUNTER
"Please let him know as his urologist I cannot make a recommendation as to whether or not he should stop a medication that another physician started related to his concussion. He was prescribed the medicine for dizziness and he is still "sick" so I recommend he contact his ENT asap to discuss. In terms of diamox causing stones, I cannot tell him if he has more stones as a result without a CT,xray or ultrasound nor tell him if he was passing a stone since he did not catch the actual stone. Even if he had more stones I could not guarantee the stones were primarily from medication.    However, if his ENT has another medication that he can offer to limit his dizziness, since diamox/acetazolamide can cause an increased risk of kidney stones and he has a h/o stones then yes, I would recommend that his ENT choose another medication if possible. Please find out who his ENT is and you can fax a copy of this note and my last progress note.     I have all cc'd his pcp    " Caller would like to discuss an/a Return call. Writer advised caller of callback within 24-72 hours.    Patient Name: Sukhdeep Ivy  Caller Name: self  Name of Facility: n/a  Alessio Response: N/A  Callback Number: [phone]  Best Availability: Anytime   Can A Detailed Message Be left? yes  Fax Number: n/a  Additional Info: Patient is calling states that he had CT last Friday and would like to confirm if someone will be calling with results or if he needs to schedule an appointment. Writer unable to further assist.   Did you confirm the message with the caller?: yes    Thank you,  Aminata Barrios
